# Patient Record
Sex: FEMALE | Race: WHITE | NOT HISPANIC OR LATINO | Employment: OTHER | URBAN - METROPOLITAN AREA
[De-identification: names, ages, dates, MRNs, and addresses within clinical notes are randomized per-mention and may not be internally consistent; named-entity substitution may affect disease eponyms.]

---

## 2018-03-06 ENCOUNTER — OFFICE VISIT (OUTPATIENT)
Dept: URGENT CARE | Facility: CLINIC | Age: 43
End: 2018-03-06
Payer: COMMERCIAL

## 2018-03-06 VITALS
SYSTOLIC BLOOD PRESSURE: 112 MMHG | RESPIRATION RATE: 18 BRPM | HEART RATE: 106 BPM | OXYGEN SATURATION: 94 % | DIASTOLIC BLOOD PRESSURE: 68 MMHG | TEMPERATURE: 98.5 F

## 2018-03-06 DIAGNOSIS — J06.9 URI WITH COUGH AND CONGESTION: ICD-10-CM

## 2018-03-06 DIAGNOSIS — R52 BODY ACHES: ICD-10-CM

## 2018-03-06 DIAGNOSIS — J02.9 PHARYNGITIS, UNSPECIFIED ETIOLOGY: Primary | ICD-10-CM

## 2018-03-06 LAB
FLUAV+FLUBV AG SPEC QL IA: NORMAL
INT CON NEG: NEGATIVE
INT CON NEG: NEGATIVE
INT CON POS: POSITIVE
INT CON POS: POSITIVE
S PYO AG THROAT QL: NORMAL

## 2018-03-06 PROCEDURE — 87880 STREP A ASSAY W/OPTIC: CPT | Performed by: PHYSICIAN ASSISTANT

## 2018-03-06 PROCEDURE — 87804 INFLUENZA ASSAY W/OPTIC: CPT | Performed by: PHYSICIAN ASSISTANT

## 2018-03-06 PROCEDURE — 99204 OFFICE O/P NEW MOD 45 MIN: CPT | Performed by: PHYSICIAN ASSISTANT

## 2018-03-06 RX ORDER — CEFDINIR 300 MG/1
300 CAPSULE ORAL 2 TIMES DAILY
Qty: 20 CAP | Refills: 0 | Status: SHIPPED | OUTPATIENT
Start: 2018-03-06 | End: 2018-03-16

## 2018-03-06 RX ORDER — BENZONATATE 200 MG/1
200 CAPSULE ORAL 3 TIMES DAILY PRN
Qty: 30 CAP | Refills: 0 | Status: SHIPPED | OUTPATIENT
Start: 2018-03-06 | End: 2018-03-16

## 2018-03-06 RX ORDER — LOSARTAN POTASSIUM 25 MG/1
25 TABLET ORAL DAILY
COMMUNITY
Start: 2018-02-06

## 2018-03-06 RX ORDER — METHOCARBAMOL 750 MG/1
TABLET, FILM COATED ORAL
COMMUNITY
Start: 2018-02-20 | End: 2019-12-26

## 2018-03-06 RX ORDER — BUPRENORPHINE HYDROCHLORIDE, NALOXONE HYDROCHLORIDE 8; 2 MG/1; MG/1
FILM, SOLUBLE BUCCAL; SUBLINGUAL
COMMUNITY
Start: 2018-01-17 | End: 2019-11-21

## 2018-03-06 RX ORDER — METOPROLOL SUCCINATE 50 MG/1
50 TABLET, EXTENDED RELEASE ORAL DAILY
COMMUNITY
Start: 2018-02-06

## 2018-03-06 RX ORDER — HYDROCHLOROTHIAZIDE 25 MG/1
25 TABLET ORAL DAILY
COMMUNITY
Start: 2018-02-06

## 2018-03-06 RX ORDER — METHYLPREDNISOLONE 4 MG/1
TABLET ORAL
Qty: 21 TAB | Refills: 0 | Status: SHIPPED | OUTPATIENT
Start: 2018-03-06 | End: 2018-03-16

## 2018-03-06 RX ORDER — TOPIRAMATE 50 MG/1
50 TABLET, FILM COATED ORAL DAILY
COMMUNITY
Start: 2018-02-11

## 2018-03-06 NOTE — PATIENT INSTRUCTIONS
Pharyngitis  Pharyngitis is a sore throat (pharynx). There is redness, pain, and swelling of your throat.  Follow these instructions at home:  · Drink enough fluids to keep your pee (urine) clear or pale yellow.  · Only take medicine as told by your doctor.  ¨ You may get sick again if you do not take medicine as told. Finish your medicines, even if you start to feel better.  ¨ Do not take aspirin.  · Rest.  · Rinse your mouth (gargle) with salt water (½ tsp of salt per 1 qt of water) every 1-2 hours. This will help the pain.  · If you are not at risk for choking, you can suck on hard candy or sore throat lozenges.  Contact a doctor if:  · You have large, tender lumps on your neck.  · You have a rash.  · You cough up green, yellow-brown, or bloody spit.  Get help right away if:  · You have a stiff neck.  · You drool or cannot swallow liquids.  · You throw up (vomit) or are not able to keep medicine or liquids down.  · You have very bad pain that does not go away with medicine.  · You have problems breathing (not from a stuffy nose).  This information is not intended to replace advice given to you by your health care provider. Make sure you discuss any questions you have with your health care provider.  Document Released: 06/05/2009 Document Revised: 05/25/2017 Document Reviewed: 08/25/2014  [employer] Interactive Patient Education © 2017 [employer]

## 2018-03-07 NOTE — PROGRESS NOTES
Subjective:      PT is a 43 y.o. female who presents with Sore Throat (with a migrane last night and body is sore and weak)            HPI  PT presents to  clinic today complaining of sore throat, pressure in ears, cough, fatigue, runny nose and headaches. PT denies CP, SOB, NVD, abdominal pain, joint pain. PT states these symptoms began around 1 day ago. PT states the pain is a 7/10 with swallowing, aching in nature and worse at night.  Pt has not taken any RX medications for this condition. The pt's medication list, problem list, and allergies have been evaluated and reviewed during today's visit.      PMH:  Past Medical History:   Diagnosis Date   • Anesthesia     postop n/v   • Arthritis     lower back   • Bursitis of left hip    • Chronic fatigue    • Cold 11/15   • Fibromyalgia    • Hx MRSA infection 2007    left knee   • Hypertension     pt states well controlled on meds   • Neurogenic bladder    • Other specified disorder of intestines     constipation    • Other specified symptom associated with female genital organs    • Pain 10-10-14    low back,, left hip and leg, right foot, 6/10   • Pain 2/19/15    5/10 lower back   • Psychiatric problem     depression, anxiety, PTSD, panic disorder   • Pyelonephritis     h/o as a child   • Renal disorder     pyelonephritis as a child, hx of freq bladder infections   • Snoring    • Urinary bladder disorder     neurogenic bladder       PSH:  Past Surgical History:   Procedure Laterality Date   • GANGLION EXCISION Left 12/14/2016    Procedure: GANGLION EXCISION VOLAR WRIST;  Surgeon: Mayur Delgadillo M.D.;  Location: SURGERY [facility];  Service:    • CARPAL TUNNEL RELEASE Right 9/23/2015    Procedure: FIRST DORSAL EXTENSOR COMPARTMENT RELEASE;  Surgeon: Mayur Delgadillo M.D.;  Location: SURGERY SAME DAY [facility];  Service:    • EXTENSOR TENDON REPAIR Left 8/5/2015    Procedure: EXTENSOR TENDON REPAIR FOR: FIRST DORSAL EXTENSOR COMPARTMENT RELEASE ;   "Surgeon: Mayur Delgadillo M.D.;  Location: SURGERY SAME DAY [facility];  Service:    • LUMBAR FUSION POSTERIOR  2015    Performed by Thomas Harris M.D. at SURGERY [facility]S   • LUMBAR EXPLORATION  2015    Performed by Thomas Harris M.D. at SURGERY [facility]S   • LUMBAR LAMINECTOMY DISKECTOMY  10/23/2014    Performed by Thomas Harris M.D. at SURGERY [facility]S   • OTHER      \"nerves burned in back\"   • PRIMARY C SECTION  &           Fam Hx:  the patient's family history is not pertinent to their current complaint    Soc HX:  Social History     Social History   • Marital status:      Spouse name: N/A   • Number of children: N/A   • Years of education: N/A     Occupational History   • Not on file.     Social History Main Topics   • Smoking status: Never Smoker   • Smokeless tobacco: Never Used   • Alcohol use No   • Drug use: No   • Sexual activity: Not on file     Other Topics Concern   • Not on file     Social History Narrative   • No narrative on file         Medications:    Current Outpatient Prescriptions:   •  SUBOXONE 8-2 MG FILM, , Disp: , Rfl:   •  hydroCHLOROthiazide (HYDRODIURIL) 25 MG Tab, , Disp: , Rfl:   •  losartan (COZAAR) 25 MG Tab, , Disp: , Rfl:   •  methocarbamol (ROBAXIN) 750 MG Tab, , Disp: , Rfl:   •  metoprolol SR (TOPROL XL) 50 MG TABLET SR 24 HR, , Disp: , Rfl:   •  topiramate (TOPAMAX) 50 MG tablet, , Disp: , Rfl:   •  cefdinir (OMNICEF) 300 MG Cap, Take 1 Cap by mouth 2 times a day for 10 days., Disp: 20 Cap, Rfl: 0  •  MethylPREDNISolone (MEDROL DOSEPAK) 4 MG Tablet Therapy Pack, Use as directed, Disp: 21 Tab, Rfl: 0  •  benzonatate (TESSALON) 200 MG capsule, Take 1 Cap by mouth 3 times a day as needed for up to 10 days., Disp: 30 Cap, Rfl: 0  •  hydrocodone-acetaminophen (NORCO) 5-325 MG Tab per tablet, Take 1-2 Tabs by mouth every four hours as needed., Disp: 20 Tab, Rfl: 0  •  ibuprofen (MOTRIN) 200 MG Tab, Take 1,000 " mg by mouth every 8 hours as needed., Disp: , Rfl:   •  topiramate (TOPOMAX) 15 MG CAPSULE SPRINKLE, Take 15 mg by mouth every day., Disp: , Rfl:   •  losartan (COZAAR) 50 MG Tab, Take 50 mg by mouth every day., Disp: , Rfl:   •  alprazolam (XANAX) 0.5 MG Tab, Take 0.5 mg by mouth 3 times a day as needed for Anxiety., Disp: , Rfl:   •  buprenorphine-naloxone (SUBOXONE) 8-2 MG SUBL, Place 1 Tab under tongue 3 times a day. Film, Disp: , Rfl:   •  multivitamin (THERAGRAN) TABS, Take 1 Tab by mouth every morning., Disp: , Rfl:   •  metoprolol (LOPRESSOR) 25 MG TABS, Take 25 mg by mouth 2 times a day. Indications: High Blood Pressure, Disp: , Rfl:   •  duloxetine (CYMBALTA) 60 MG CPEP delayed-release capsule, Take 60 mg by mouth every morning., Disp: , Rfl:       Allergies:  Septra [bactrim] and Other food    ROS  Constitutional: Positive for malaise/fatigue.   HENT: Positive for congestion and sore throat. Negative for ear pain.    Eyes: Negative for blurred vision, double vision and photophobia.   Respiratory: Positive for cough and sputum production. Negative for hemoptysis, shortness of breath and wheezing.    Cardiovascular: Negative for chest pain and palpitations.   Gastrointestinal: Negative for nausea, vomiting, abdominal pain, diarrhea and constipation.   Genitourinary: Negative for dysuria and flank pain.   Musculoskeletal: Negative for falls and myalgias.   Skin: Negative for itching and rash.   Neurological: Positive for headaches. Negative for dizziness and tingling.   Endo/Heme/Allergies: Does not bruise/bleed easily.   Psychiatric/Behavioral: Negative for depression. The patient is not nervous/anxious.             Objective:     /68   Pulse (!) 106   Temp 36.9 °C (98.5 °F)   Resp 18   SpO2 94%      Physical Exam      Constitutional: PT is oriented to person, place, and time. PT appears well-developed and well-nourished. No distress.   HENT:   Head: Normocephalic and atraumatic.   Right Ear:  Hearing, tympanic membrane, external ear and ear canal normal.   Left Ear: Hearing, tympanic membrane, external ear and ear canal normal.   Nose: Mucosal edema, rhinorrhea and sinus tenderness present. Right sinus exhibits frontal sinus tenderness. Left sinus exhibits frontal sinus tenderness.   Mouth/Throat: Uvula is midline. Mucous membranes are pale. Posterior oropharyngeal edema and posterior oropharyngeal erythema present. No oropharyngeal exudate.   Eyes: Conjunctivae normal and EOM are normal. Pupils are equal, round, and reactive to light.   Neck: Normal range of motion. Neck supple. No thyromegaly present.   Cardiovascular: Normal rate, regular rhythm, normal heart sounds and intact distal pulses.  Exam reveals no gallop and no friction rub.    No murmur heard.  Pulmonary/Chest: Effort normal and breath sounds normal. No respiratory distress. PT has no wheezes. PT has no rales. PT exhibits no tenderness.   Abdominal: Soft. Bowel sounds are normal. PT exhibits no distension and no mass. There is no tenderness. There is no rebound and no guarding.   Musculoskeletal: Normal range of motion. PT exhibits no edema and no tenderness.   Lymphadenopathy:     PT has no cervical adenopathy.   Neurological: PT is alert and oriented to person, place, and time. PT displays normal reflexes. No cranial nerve deficit. PT exhibits normal muscle tone. Coordination normal.   Skin: Skin is warm and dry. No rash noted. No erythema.   Psychiatric: PT has a normal mood and affect. PT behavior is normal. Judgment and thought content normal.          Assessment/Plan:     1. Pharyngitis, unspecified etiology  Back-up abx if symptoms do not improve in 2-3 days. PT on clinical presentation has exudate on oropharynx and it's possible beyond the strep A testing that this could be a different type of strep (C/G) or A. haemolyticum     - POCT Rapid Strep A-->NEG  - cefdinir (OMNICEF) 300 MG Cap; Take 1 Cap by mouth 2 times a day for 10  days.  Dispense: 20 Cap; Refill: 0  - MethylPREDNISolone (MEDROL DOSEPAK) 4 MG Tablet Therapy Pack; Use as directed  Dispense: 21 Tab; Refill: 0    2. URI with cough and congestion    - POCT Influenza A/B-->NEG  - MethylPREDNISolone (MEDROL DOSEPAK) 4 MG Tablet Therapy Pack; Use as directed  Dispense: 21 Tab; Refill: 0  - benzonatate (TESSALON) 200 MG capsule; Take 1 Cap by mouth 3 times a day as needed for up to 10 days.  Dispense: 30 Cap; Refill: 0    3. Body aches      Rest, fluids encouraged.  OTC decongestant for congestion  Note given for work.  AVS with medical info given.  Pt was in full understanding and agreement with the plan.  Follow-up as needed if symptoms worsen or fail to improve.

## 2018-03-16 ENCOUNTER — APPOINTMENT (OUTPATIENT)
Dept: RADIOLOGY | Facility: IMAGING CENTER | Age: 43
End: 2018-03-16
Attending: PHYSICIAN ASSISTANT
Payer: COMMERCIAL

## 2018-03-16 ENCOUNTER — OFFICE VISIT (OUTPATIENT)
Dept: URGENT CARE | Facility: CLINIC | Age: 43
End: 2018-03-16
Payer: COMMERCIAL

## 2018-03-16 VITALS
TEMPERATURE: 97.7 F | RESPIRATION RATE: 20 BRPM | SYSTOLIC BLOOD PRESSURE: 124 MMHG | HEIGHT: 63 IN | HEART RATE: 100 BPM | OXYGEN SATURATION: 91 % | DIASTOLIC BLOOD PRESSURE: 68 MMHG | WEIGHT: 220 LBS | BODY MASS INDEX: 38.98 KG/M2

## 2018-03-16 DIAGNOSIS — J06.9 URI WITH COUGH AND CONGESTION: ICD-10-CM

## 2018-03-16 DIAGNOSIS — J40 BRONCHITIS: Primary | ICD-10-CM

## 2018-03-16 PROCEDURE — 71046 X-RAY EXAM CHEST 2 VIEWS: CPT | Mod: 26 | Performed by: PHYSICIAN ASSISTANT

## 2018-03-16 PROCEDURE — 99214 OFFICE O/P EST MOD 30 MIN: CPT | Performed by: PHYSICIAN ASSISTANT

## 2018-03-16 RX ORDER — PROMETHAZINE HYDROCHLORIDE AND CODEINE PHOSPHATE 6.25; 1 MG/5ML; MG/5ML
5 SYRUP ORAL 4 TIMES DAILY PRN
Qty: 240 ML | Refills: 0 | Status: SHIPPED | OUTPATIENT
Start: 2018-03-16 | End: 2018-03-30

## 2018-03-16 RX ORDER — DOXYCYCLINE HYCLATE 100 MG
100 TABLET ORAL 2 TIMES DAILY
Qty: 14 TAB | Refills: 0 | Status: SHIPPED | OUTPATIENT
Start: 2018-03-16 | End: 2018-03-23

## 2018-03-16 RX ORDER — IPRATROPIUM BROMIDE AND ALBUTEROL SULFATE 2.5; .5 MG/3ML; MG/3ML
3 SOLUTION RESPIRATORY (INHALATION) ONCE
Status: COMPLETED | OUTPATIENT
Start: 2018-03-16 | End: 2018-03-16

## 2018-03-16 RX ORDER — METHYLPREDNISOLONE 4 MG/1
TABLET ORAL
Qty: 21 TAB | Refills: 0 | Status: SHIPPED | OUTPATIENT
Start: 2018-03-16 | End: 2019-11-21

## 2018-03-16 RX ADMIN — IPRATROPIUM BROMIDE AND ALBUTEROL SULFATE 3 ML: 2.5; .5 SOLUTION RESPIRATORY (INHALATION) at 13:37

## 2018-03-16 NOTE — PROGRESS NOTES
Subjective:      Pt is a 43 y.o. female who presents with URI            HPI  PT presents to [facility] clinic today complaining of sore throat,  pressure in ears, cough, fatigue, runny nose, wheezing and SOB. PT denies CP, NVD, abdominal pain, joint pain. PT states these symptoms began around 7 days ago. PT states the pain is a 7/10 with coughing, aching in nature and worse at night.  Pt has not taken any RX medications for this condition. The pt's medication list, problem list, and allergies have been evaluated and reviewed during today's visit.    PMH:  Past Medical History:   Diagnosis Date   • Anesthesia     postop n/v   • Arthritis     lower back   • Bursitis of left hip    • Chronic fatigue    • Cold 11/15   • Fibromyalgia    • Hx MRSA infection 2007    left knee   • Hypertension     pt states well controlled on meds   • Neurogenic bladder    • Other specified disorder of intestines     constipation    • Other specified symptom associated with female genital organs    • Pain 10-10-14    low back,, left hip and leg, right foot, 6/10   • Pain 2/19/15    5/10 lower back   • Psychiatric problem     depression, anxiety, PTSD, panic disorder   • Pyelonephritis     h/o as a child   • Renal disorder     pyelonephritis as a child, hx of freq bladder infections   • Snoring    • Urinary bladder disorder     neurogenic bladder       PSH:  Past Surgical History:   Procedure Laterality Date   • GANGLION EXCISION Left 12/14/2016    Procedure: GANGLION EXCISION VOLAR WRIST;  Surgeon: Mayur Delgadillo M.D.;  Location: SURGERY [facility];  Service:    • CARPAL TUNNEL RELEASE Right 9/23/2015    Procedure: FIRST DORSAL EXTENSOR COMPARTMENT RELEASE;  Surgeon: Mayur Delgadillo M.D.;  Location: SURGERY SAME DAY [facility];  Service:    • EXTENSOR TENDON REPAIR Left 8/5/2015    Procedure: EXTENSOR TENDON REPAIR FOR: FIRST DORSAL EXTENSOR COMPARTMENT RELEASE ;  Surgeon: Mayur Delgadillo M.D.;  Location: SURGERY SAME DAY  "[facility];  Service:    • LUMBAR FUSION POSTERIOR  2015    Performed by Thomas Harris M.D. at SURGERY [facility]S   • LUMBAR EXPLORATION  2015    Performed by Thomas Harris M.D. at SURGERY [facility]   • LUMBAR LAMINECTOMY DISKECTOMY  10/23/2014    Performed by Thomas Harris M.D. at SURGERY [facility]   • OTHER      \"nerves burned in back\"   • PRIMARY C SECTION  &           Fam Hx:  the patient's family history is not pertinent to their current complaint    Soc HX:  Social History     Social History   • Marital status:      Spouse name: N/A   • Number of children: N/A   • Years of education: N/A     Occupational History   • Not on file.     Social History Main Topics   • Smoking status: Never Smoker   • Smokeless tobacco: Never Used   • Alcohol use No   • Drug use: No   • Sexual activity: Not on file     Other Topics Concern   • Not on file     Social History Narrative   • No narrative on file         Medications:    Current Outpatient Prescriptions:   •  doxycycline (VIBRAMYCIN) 100 MG Tab, Take 1 Tab by mouth 2 times a day for 7 days., Disp: 14 Tab, Rfl: 0  •  promethazine-codeine (PHENERGAN-CODEINE) 6.25-10 MG/5ML Syrup, Take 5 mL by mouth 4 times a day as needed for up to 14 days., Disp: 240 mL, Rfl: 0  •  MethylPREDNISolone (MEDROL DOSEPAK) 4 MG Tablet Therapy Pack, Use as directed, Disp: 21 Tab, Rfl: 0  •  SUBOXONE 8-2 MG FILM, , Disp: , Rfl:   •  hydroCHLOROthiazide (HYDRODIURIL) 25 MG Tab, , Disp: , Rfl:   •  losartan (COZAAR) 25 MG Tab, , Disp: , Rfl:   •  methocarbamol (ROBAXIN) 750 MG Tab, , Disp: , Rfl:   •  metoprolol SR (TOPROL XL) 50 MG TABLET SR 24 HR, , Disp: , Rfl:   •  topiramate (TOPAMAX) 50 MG tablet, , Disp: , Rfl:   •  cefdinir (OMNICEF) 300 MG Cap, Take 1 Cap by mouth 2 times a day for 10 days., Disp: 20 Cap, Rfl: 0  •  benzonatate (TESSALON) 200 MG capsule, Take 1 Cap by mouth 3 times a day as needed for up to 10 days., Disp: 30 " Cap, Rfl: 0  •  ibuprofen (MOTRIN) 200 MG Tab, Take 1,000 mg by mouth every 8 hours as needed., Disp: , Rfl:   •  hydrocodone-acetaminophen (NORCO) 5-325 MG Tab per tablet, Take 1-2 Tabs by mouth every four hours as needed., Disp: 20 Tab, Rfl: 0  •  topiramate (TOPOMAX) 15 MG CAPSULE SPRINKLE, Take 15 mg by mouth every day., Disp: , Rfl:   •  losartan (COZAAR) 50 MG Tab, Take 50 mg by mouth every day., Disp: , Rfl:   •  alprazolam (XANAX) 0.5 MG Tab, Take 0.5 mg by mouth 3 times a day as needed for Anxiety., Disp: , Rfl:   •  buprenorphine-naloxone (SUBOXONE) 8-2 MG SUBL, Place 1 Tab under tongue 3 times a day. Film, Disp: , Rfl:   •  multivitamin (THERAGRAN) TABS, Take 1 Tab by mouth every morning., Disp: , Rfl:   •  metoprolol (LOPRESSOR) 25 MG TABS, Take 25 mg by mouth 2 times a day. Indications: High Blood Pressure, Disp: , Rfl:   •  duloxetine (CYMBALTA) 60 MG CPEP delayed-release capsule, Take 60 mg by mouth every morning., Disp: , Rfl:       Allergies:  Septra [bactrim] and Other food    ROS  Review of Systems   Constitutional: Positive for malaise/fatigue. Negative for fever and diaphoresis.   HENT: Positive for congestion, ear pain and sore throat. Negative for ear discharge, hearing loss, nosebleeds and tinnitus.    Eyes: Negative for blurred vision, double vision and photophobia.   Respiratory: Positive for cough, sputum production, shortness of breath and wheezing. Negative for hemoptysis.    Cardiovascular: Negative for chest pain and palpitations.   Gastrointestinal: Negative for nausea, vomiting, abdominal pain, diarrhea and constipation.   Genitourinary: Negative for dysuria and flank pain.   Musculoskeletal: Negative for joint pain and myalgias.   Skin: Negative for itching and rash.   Neurological: Positive for headaches. Negative for dizziness, tingling and weakness.   Endo/Heme/Allergies: Does not bruise/bleed easily.   Psychiatric/Behavioral: Negative for depression. The patient is not  "nervous/anxious.               Objective:     /68   Pulse 100   Temp 36.5 °C (97.7 °F)   Resp 20   Ht 1.6 m (5' 3\")   Wt 99.8 kg (220 lb)   SpO2 91%   BMI 38.97 kg/m²      Physical Exam      Physical Exam   Constitutional: PT is oriented to person, place, and time. PT appears well-developed and well-nourished. No distress.   HENT:   Head: Normocephalic and atraumatic.   Right Ear: Hearing, tympanic membrane, external ear and ear canal normal.   Left Ear: Hearing, tympanic membrane, external ear and ear canal normal.   Nose: Mucosal edema, rhinorrhea and sinus tenderness present. Right sinus exhibits frontal sinus tenderness. Left sinus exhibits frontal sinus tenderness.   Mouth/Throat: Uvula is midline. Mucous membranes are pale. Posterior oropharyngeal edema and posterior oropharyngeal erythema present. No oropharyngeal exudate.   Eyes: Conjunctivae normal and EOM are normal. Pupils are equal, round, and reactive to light. Right eye exhibits no discharge. Left eye exhibits no discharge.   Neck: Normal range of motion. Neck supple. No thyromegaly present.   Cardiovascular: Normal rate, regular rhythm, normal heart sounds and intact distal pulses.  Exam reveals no gallop and no friction rub.    No murmur heard.  Pulmonary/Chest: Effort normal. No respiratory distress. PT has wheezes. PT has no rales. PT exhibits tenderness.   Abdominal: Soft. Bowel sounds are normal. PT exhibits no distension and no mass. There is no tenderness. There is no rebound and no guarding.   Musculoskeletal: Normal range of motion. PT exhibits no edema and no tenderness.   Lymphadenopathy:     PT has no cervical adenopathy.   Neurological: Pt is alert and oriented to person, place, and time. Pt has normal reflexes. No cranial nerve deficit.   Skin: Skin is warm and dry. No rash noted. No erythema.   Psychiatric: PT has a normal mood and affect. Pt behavior is normal. Judgment and thought content normal.     RADS:  Narrative "       3/16/2018 12:48 PM    HISTORY/REASON FOR EXAM:  Cough and congestion for one week.      TECHNIQUE/EXAM DESCRIPTION AND NUMBER OF VIEWS:  Two views of the chest.    COMPARISON:  10/10/2014    FINDINGS:  The heart is normal in size.  No pulmonary infiltrates or consolidations are noted.  No pleural effusions are appreciated.     Impression       Negative two views of the chest.   Reading Provider Reading Date   Silvestre King M.D. Mar 16, 2018   Signing Provider Signing Date Signing Time   Silvestre King M.D. Mar 16, 2018  1:00 PM          Assessment/Plan:     1. Bronchitis    - doxycycline (VIBRAMYCIN) 100 MG Tab; Take 1 Tab by mouth 2 times a day for 7 days.  Dispense: 14 Tab; Refill: 0  - MethylPREDNISolone (MEDROL DOSEPAK) 4 MG Tablet Therapy Pack; Use as directed  Dispense: 21 Tab; Refill: 0    2. URI with cough and congestion    - promethazine-codeine (PHENERGAN-CODEINE) 6.25-10 MG/5ML Syrup; Take 5 mL by mouth 4 times a day as needed for up to 14 days.  Dispense: 240 mL; Refill: 0  - MethylPREDNISolone (MEDROL DOSEPAK) 4 MG Tablet Therapy Pack; Use as directed  Dispense: 21 Tab; Refill: 0    Rest, fluids encouraged.  OTC decongestant for congestion/cough  AVS with medical info given.  Pt was in full understanding and agreement with the plan.  Follow-up as needed if symptoms worsen or fail to improve.

## 2018-03-16 NOTE — PATIENT INSTRUCTIONS
Acute Bronchitis, Adult  Acute bronchitis is when air tubes (bronchi) in the lungs suddenly get swollen. The condition can make it hard to breathe. It can also cause these symptoms:  · A cough.  · Coughing up clear, yellow, or green mucus.  · Wheezing.  · Chest congestion.  · Shortness of breath.  · A fever.  · Body aches.  · Chills.  · A sore throat.  Follow these instructions at home:  Medicines  · Take over-the-counter and prescription medicines only as told by your doctor.  · If you were prescribed an antibiotic medicine, take it as told by your doctor. Do not stop taking the antibiotic even if you start to feel better.  General instructions  · Rest.  · Drink enough fluids to keep your pee (urine) clear or pale yellow.  · Avoid smoking and secondhand smoke. If you smoke and you need help quitting, ask your doctor. Quitting will help your lungs heal faster.  · Use an inhaler, cool mist vaporizer, or humidifier as told by your doctor.  · Keep all follow-up visits as told by your doctor. This is important.  How is this prevented?  To lower your risk of getting this condition again:  · Wash your hands often with soap and water. If you cannot use soap and water, use hand .  · Avoid contact with people who have cold symptoms.  · Try not to touch your hands to your mouth, nose, or eyes.  · Make sure to get the flu shot every year.  Contact a doctor if:  · Your symptoms do not get better in 2 weeks.  Get help right away if:  · You cough up blood.  · You have chest pain.  · You have very bad shortness of breath.  · You become dehydrated.  · You faint (pass out) or keep feeling like you are going to pass out.  · You keep throwing up (vomiting).  · You have a very bad headache.  · Your fever or chills gets worse.  This information is not intended to replace advice given to you by your health care provider. Make sure you discuss any questions you have with your health care provider.  Document Released: 06/05/2009  Document Revised: 07/26/2017 Document Reviewed: 06/07/2017  [employer] Interactive Patient Education © 2017 [employer]

## 2019-08-01 ENCOUNTER — OFFICE VISIT (OUTPATIENT)
Dept: URGENT CARE | Facility: MEDICAL CENTER | Age: 44
End: 2019-08-01
Payer: COMMERCIAL

## 2019-08-01 VITALS
HEIGHT: 63 IN | TEMPERATURE: 97.8 F | DIASTOLIC BLOOD PRESSURE: 86 MMHG | RESPIRATION RATE: 14 BRPM | BODY MASS INDEX: 35.44 KG/M2 | WEIGHT: 200 LBS | OXYGEN SATURATION: 96 % | SYSTOLIC BLOOD PRESSURE: 132 MMHG | HEART RATE: 82 BPM

## 2019-08-01 DIAGNOSIS — G43.109 MIGRAINE WITH AURA AND WITHOUT STATUS MIGRAINOSUS, NOT INTRACTABLE: ICD-10-CM

## 2019-08-01 PROCEDURE — 99214 OFFICE O/P EST MOD 30 MIN: CPT | Performed by: PHYSICIAN ASSISTANT

## 2019-08-01 RX ORDER — ONDANSETRON 2 MG/ML
4 INJECTION INTRAMUSCULAR; INTRAVENOUS ONCE
Status: COMPLETED | OUTPATIENT
Start: 2019-08-01 | End: 2019-08-01

## 2019-08-01 RX ORDER — KETOROLAC TROMETHAMINE 30 MG/ML
60 INJECTION, SOLUTION INTRAMUSCULAR; INTRAVENOUS ONCE
Status: COMPLETED | OUTPATIENT
Start: 2019-08-01 | End: 2019-08-01

## 2019-08-01 RX ADMIN — ONDANSETRON 4 MG: 2 INJECTION INTRAMUSCULAR; INTRAVENOUS at 13:20

## 2019-08-01 RX ADMIN — KETOROLAC TROMETHAMINE 60 MG: 30 INJECTION, SOLUTION INTRAMUSCULAR; INTRAVENOUS at 13:22

## 2019-08-01 ASSESSMENT — ENCOUNTER SYMPTOMS
HEADACHES: 1
BLOOD IN STOOL: 1
ABDOMINAL PAIN: 0
FEVER: 0
NAUSEA: 1
VOMITING: 1
CHILLS: 0
SENSORY CHANGE: 0
CONSTIPATION: 0

## 2019-08-01 NOTE — PROGRESS NOTES
Subjective:   Santa Carter is a 44 y.o. female who presents today with   Chief Complaint   Patient presents with   • Nausea     headache, made herself throw up, migraine, x today blood in stool       HPI  Patient presents for a new problem that is started since today.  She states she has been taking Topamax with relief of her migraines that she typically has.  This migraine feels similar to those that she has had in the past but the Topamax is not relieving her symptoms.  She states she has also been nauseous and noticed a very small amount of blood in her stool this morning.  She states that she made herself throw up.  In the past she has had Toradol shots that has helped her symptoms.  She is scheduled for an SI injection and is why she is traveling to [place] today.  Patient denies any fever, chills, vision changes or any other associated symptoms despite those listed above.  PMH:  has a past medical history of Anesthesia, Arthritis, Bursitis of left hip, Chronic fatigue, Cold (11/15), Fibromyalgia, MRSA infection (2007), Hypertension, Neurogenic bladder, Other specified disorder of intestines, Other specified symptom associated with female genital organs, Pain (10-10-14), Pain (2/19/15), Psychiatric problem, Pyelonephritis, Renal disorder, Snoring, and Urinary bladder disorder.  MEDS:   Current Outpatient Medications:   •  hydroCHLOROthiazide (HYDRODIURIL) 25 MG Tab, , Disp: , Rfl:   •  losartan (COZAAR) 25 MG Tab, , Disp: , Rfl:   •  methocarbamol (ROBAXIN) 750 MG Tab, , Disp: , Rfl:   •  topiramate (TOPAMAX) 50 MG tablet, , Disp: , Rfl:   •  losartan (COZAAR) 50 MG Tab, Take 50 mg by mouth every day., Disp: , Rfl:   •  alprazolam (XANAX) 0.5 MG Tab, Take 0.5 mg by mouth 3 times a day as needed for Anxiety., Disp: , Rfl:   •  buprenorphine-naloxone (SUBOXONE) 8-2 MG SUBL, Place 1 Tab under tongue 3 times a day. Film, Disp: , Rfl:   •  metoprolol (LOPRESSOR) 25 MG TABS, Take 25 mg by mouth 2 times a  "day. Indications: High Blood Pressure, Disp: , Rfl:   •  duloxetine (CYMBALTA) 60 MG CPEP delayed-release capsule, Take 60 mg by mouth every morning., Disp: , Rfl:   •  MethylPREDNISolone (MEDROL DOSEPAK) 4 MG Tablet Therapy Pack, Use as directed, Disp: 21 Tab, Rfl: 0  •  SUBOXONE 8-2 MG FILM, , Disp: , Rfl:   •  metoprolol SR (TOPROL XL) 50 MG TABLET SR 24 HR, , Disp: , Rfl:   •  hydrocodone-acetaminophen (NORCO) 5-325 MG Tab per tablet, Take 1-2 Tabs by mouth every four hours as needed. (Patient not taking: Reported on 8/1/2019), Disp: 20 Tab, Rfl: 0  •  topiramate (TOPOMAX) 15 MG CAPSULE SPRINKLE, Take 15 mg by mouth every day., Disp: , Rfl:   •  multivitamin (THERAGRAN) TABS, Take 1 Tab by mouth every morning., Disp: , Rfl:   ALLERGIES:   Allergies   Allergen Reactions   • Septra [Bactrim] Anaphylaxis and Hives     ORT=0830   • Other Food Nausea     Coconut fresh only ok with coconut oil in food     SURGHX:   Past Surgical History:   Procedure Laterality Date   • GANGLION EXCISION Left 12/14/2016    Procedure: GANGLION EXCISION VOLAR WRIST;  Surgeon: Mayur Delgadillo M.D.;  Location: SURGERY [facility];  Service:    • CARPAL TUNNEL RELEASE Right 9/23/2015    Procedure: FIRST DORSAL EXTENSOR COMPARTMENT RELEASE;  Surgeon: Mayur Delgadillo M.D.;  Location: SURGERY SAME DAY [facility];  Service:    • EXTENSOR TENDON REPAIR Left 8/5/2015    Procedure: EXTENSOR TENDON REPAIR FOR: FIRST DORSAL EXTENSOR COMPARTMENT RELEASE ;  Surgeon: Mayur Delgadillo M.D.;  Location: SURGERY SAME DAY [facility];  Service:    • LUMBAR FUSION POSTERIOR  2/25/2015    Performed by Thomas Harris M.D. at SURGERY [facility]   • LUMBAR EXPLORATION  2/25/2015    Performed by Thomas Harris M.D. at SURGERY [facility]   • LUMBAR LAMINECTOMY DISKECTOMY  10/23/2014    Performed by Thomas Harris M.D. at [facility]   • OTHER  2010    \"nerves burned in back\"   • PRIMARY C SECTION  1996&2005    " "     SOCHX:  reports that she has never smoked. She has never used smokeless tobacco. She reports that she does not drink alcohol or use drugs.  FH: Reviewed with patient, not pertinent to this visit.       Review of Systems   Constitutional: Negative for chills and fever.   HENT: Negative for hearing loss.    Gastrointestinal: Positive for blood in stool, nausea and vomiting. Negative for abdominal pain and constipation.   Genitourinary: Negative for dysuria, frequency, hematuria and urgency.   Neurological: Positive for headaches. Negative for sensory change.   All other systems reviewed and are negative.       Objective:   /86   Pulse 82   Temp 36.6 °C (97.8 °F)   Resp 14   Ht 1.6 m (5' 3\")   Wt 90.7 kg (200 lb)   SpO2 96%   BMI 35.43 kg/m²   Physical Exam   Constitutional: She appears well-developed and well-nourished. No distress.   HENT:   Head: Normocephalic and atraumatic.   Right Ear: Hearing normal.   Left Ear: Hearing normal.   Eyes: Pupils are equal, round, and reactive to light.   Cardiovascular: Normal rate, regular rhythm and normal heart sounds.   Pulmonary/Chest: Effort normal and breath sounds normal.   Abdominal: Soft. Bowel sounds are normal. She exhibits no distension. There is no tenderness.   Musculoskeletal:   Normal movement in all 4 extremities   Neurological: She is alert. No cranial nerve deficit or sensory deficit. Coordination normal. GCS eye subscore is 4. GCS verbal subscore is 5. GCS motor subscore is 6.   Skin: Skin is warm and dry.   Psychiatric: She has a normal mood and affect.   Nursing note and vitals reviewed.    Patient tolerated injection in clinic today and reports mild relief of her symptoms.    Assessment/Plan:   Assessment    1. Migraine with aura and without status migrainosus, not intractable  - ketorolac (TORADOL) injection 60 mg  - ondansetron (ZOFRAN) syringe/vial injection 4 mg  Patient will notify her physician of medication that she received " today including Toradol and Zofran and possibly reschedule her injection.  Discussed potential side effects including but not limited to increased risk of GI bleed and patient is understanding and would like to have the injections done today in clinic.  Differential diagnosis, natural history, supportive care, and indications for immediate follow-up discussed.   Patient given instructions and understanding of medications and treatment.    If not improving in 3-5 days, F/U with PCP or return to UC if symptoms worsen.    Patient agreeable to plan.      Please note that this dictation was created using voice recognition software. I have made every reasonable attempt to correct obvious errors, but I expect that there are errors of grammar and possibly content that I did not discover before finalizing the note.    Reji Ramirez PA-C

## 2019-11-21 ENCOUNTER — OFFICE VISIT (OUTPATIENT)
Dept: URGENT CARE | Facility: CLINIC | Age: 44
End: 2019-11-21
Payer: COMMERCIAL

## 2019-11-21 ENCOUNTER — HOSPITAL ENCOUNTER (OUTPATIENT)
Dept: RADIOLOGY | Facility: MEDICAL CENTER | Age: 44
End: 2019-11-21
Attending: NURSE PRACTITIONER
Payer: COMMERCIAL

## 2019-11-21 VITALS
OXYGEN SATURATION: 98 % | RESPIRATION RATE: 12 BRPM | TEMPERATURE: 97.8 F | SYSTOLIC BLOOD PRESSURE: 106 MMHG | WEIGHT: 198.6 LBS | HEART RATE: 80 BPM | DIASTOLIC BLOOD PRESSURE: 78 MMHG | HEIGHT: 63 IN | BODY MASS INDEX: 35.19 KG/M2

## 2019-11-21 DIAGNOSIS — R35.0 URINARY FREQUENCY: ICD-10-CM

## 2019-11-21 DIAGNOSIS — R10.9 LEFT LATERAL ABDOMINAL PAIN: ICD-10-CM

## 2019-11-21 DIAGNOSIS — Z87.440 HISTORY OF RECURRENT UTIS: ICD-10-CM

## 2019-11-21 LAB
APPEARANCE UR: NORMAL
BILIRUB UR STRIP-MCNC: NEGATIVE MG/DL
COLOR UR AUTO: YELLOW
GLUCOSE UR STRIP.AUTO-MCNC: NEGATIVE MG/DL
KETONES UR STRIP.AUTO-MCNC: NEGATIVE MG/DL
LEUKOCYTE ESTERASE UR QL STRIP.AUTO: NEGATIVE
NITRITE UR QL STRIP.AUTO: NEGATIVE
PH UR STRIP.AUTO: 5.5 [PH] (ref 5–8)
PROT UR QL STRIP: NEGATIVE MG/DL
RBC UR QL AUTO: NEGATIVE
SP GR UR STRIP.AUTO: 1.02
UROBILINOGEN UR STRIP-MCNC: 0.2 MG/DL

## 2019-11-21 PROCEDURE — 99213 OFFICE O/P EST LOW 20 MIN: CPT | Performed by: NURSE PRACTITIONER

## 2019-11-21 PROCEDURE — 76775 US EXAM ABDO BACK WALL LIM: CPT

## 2019-11-21 PROCEDURE — 81002 URINALYSIS NONAUTO W/O SCOPE: CPT | Performed by: NURSE PRACTITIONER

## 2019-11-21 RX ORDER — CIPROFLOXACIN 500 MG/1
500 TABLET, FILM COATED ORAL
Refills: 0 | COMMUNITY
Start: 2019-11-13 | End: 2019-12-26

## 2019-11-21 NOTE — PROGRESS NOTES
Chief Complaint   Patient presents with   • UTI     last wednesday had uti and finished the antibiotics, just wants to make sure its gone, still feels pain on the lower abdominal, still feels the urge to urine x 7 days        HISTORY OF PRESENT ILLNESS: Patient is a 44 y.o. female who presents to urgent care today with concerns of a recent UTI.  Patient notes UTI symptoms started approximately 2 months ago.  She treated herself at home with 2 courses of unknown antibiotics with some improvement.  Her symptoms then worsened and she was treated in [place] with Cipro for 1 week.  She completed the Cipro course 2 days ago.  Her urgency has resolved but she is still having frequency as well as some left-sided abdominal discomfort.  She denies history of kidney stones but does note history of recurrent UTIs and pyelonephritis as a child.  She has been evaluated by urology at some point, but has not been seen for years.  She otherwise feels well denies any fever, chills, malaise.    Patient Active Problem List    Diagnosis Date Noted   • Ganglion of left wrist 12/14/2016   • Radial styloid tenosynovitis 08/05/2015   • Thoracic or lumbosacral neuritis or radiculitis, unspecified 10/23/2014       Allergies:Septra [bactrim] and Other food    Current Outpatient Medications Ordered in Epic   Medication Sig Dispense Refill   • ciprofloxacin (CIPRO) 500 MG Tab Take 500 mg by mouth. FOR 7 DAYS  0   • hydroCHLOROthiazide (HYDRODIURIL) 25 MG Tab      • losartan (COZAAR) 25 MG Tab      • methocarbamol (ROBAXIN) 750 MG Tab      • metoprolol SR (TOPROL XL) 50 MG TABLET SR 24 HR      • topiramate (TOPAMAX) 50 MG tablet      • losartan (COZAAR) 50 MG Tab Take 50 mg by mouth every day.     • alprazolam (XANAX) 0.5 MG Tab Take 0.5 mg by mouth 3 times a day as needed for Anxiety.     • buprenorphine-naloxone (SUBOXONE) 8-2 MG SUBL Place 1 Tab under tongue 3 times a day. Film     • multivitamin (THERAGRAN) TABS Take 1 Tab by mouth every  morning.     • metoprolol (LOPRESSOR) 25 MG TABS Take 25 mg by mouth 2 times a day. Indications: High Blood Pressure     • duloxetine (CYMBALTA) 60 MG CPEP delayed-release capsule Take 60 mg by mouth every morning.       No current [facility]-ordered facility-administered medications on file.        Past Medical History:   Diagnosis Date   • Anesthesia     postop n/v   • Arthritis     lower back   • Bursitis of left hip    • Chronic fatigue    • Cold 11/15   • Fibromyalgia    • Hx MRSA infection 2007    left knee   • Hypertension     pt states well controlled on meds   • Neurogenic bladder    • Other specified disorder of intestines     constipation    • Other specified symptom associated with female genital organs    • Pain 10-10-14    low back,, left hip and leg, right foot, 6/10   • Pain 2/19/15    5/10 lower back   • Psychiatric problem     depression, anxiety, PTSD, panic disorder   • Pyelonephritis     h/o as a child   • Renal disorder     pyelonephritis as a child, hx of freq bladder infections   • Snoring    • Urinary bladder disorder     neurogenic bladder       Social History     Tobacco Use   • Smoking status: Never Smoker   • Smokeless tobacco: Never Used   Substance Use Topics   • Alcohol use: No   • Drug use: No       No family status information on file.   History reviewed. No pertinent family history.    ROS:  Review of Systems   Constitutional: Negative for fever, chills, weight loss, malaise, and fatigue.   HENT: Negative for ear pain, nosebleeds, congestion, sore throat and neck pain.    Eyes: Negative for vision changes.   Neuro: Negative for headache, sensory changes, weakness, seizure, LOC.   Cardiovascular: Negative for chest pain, palpitations, orthopnea and leg swelling.   Respiratory: Negative for cough, sputum production, shortness of breath and wheezing.   Gastrointestinal: Positive for intermittent left-sided and lower quadrant abdominal pain.  Negative for nausea, vomiting or diarrhea.  "  Genitourinary: Positive for frequency.  Negative for dysuria, urgency.  Musculoskeletal: Negative for falls, neck pain, back pain, joint pain, myalgias.   Skin: Negative for rash, diaphoresis.     Exam:  /78   Pulse 80   Temp 36.6 °C (97.8 °F) (Temporal)   Resp 12   Ht 1.6 m (5' 3\")   Wt 90.1 kg (198 lb 9.6 oz)   SpO2 98%   General: well-nourished, well-developed female in NAD  Head: normocephalic, atraumatic  Eyes: PERRLA, no conjunctival injection, acuity grossly intact, lids normal.  Ears: normal shape and symmetry, no tenderness, no discharge. External canals are without any significant edema or erythema. Tympanic membranes are without any inflammation, no effusion. Gross auditory acuity is intact.  Nose: symmetrical without tenderness, no discharge.  Mouth/Throat: reasonable hygiene, no erythema, exudates or tonsillar enlargement.  Neck: no masses, range of motion within normal limits, no tracheal deviation. No obvious thyroid enlargement.   Lymph: no cervical adenopathy. No supraclavicular adenopathy.   Neuro: alert and oriented. Cranial nerves 1-12 grossly intact. No sensory deficit.   Cardiovascular: regular rate and rhythm. No edema.  Pulmonary: no distress. Chest is symmetrical with respiration, no wheezes, crackles, or rhonchi.   Abdomen: soft, very mild tenderness to left lower quadrant and groin, no guarding, no hepatosplenomegaly.  No CVA tenderness.  Musculoskeletal: no clubbing, appropriate muscle tone, gait is stable.  Skin: warm, dry, intact, no clubbing, no cyanosis, no rashes.   Psych: appropriate mood, affect, judgement.         Assessment/Plan:  1. History of recurrent UTIs  POCT Urinalysis    US-RENAL   2. Urinary urgency  US-RENAL   3. Left lateral abdominal pain  US-RENAL       Urine is negative today for any signs of infectious process.  She does have some tenderness to her right lateral side, renal ultrasound is ordered for today, will call patient with results.  Supportive " care, differential diagnoses, and indications for immediate follow-up discussed with patient.   Pathogenesis of diagnosis discussed including typical length and natural progression.   Instructed to return to clinic or nearest emergency department for any change in condition, further concerns, or worsening of symptoms.  Patient states understanding of the plan of care and discharge instructions.  Instructed to make an appointment, for follow up, with her primary care provider.        Please note that this dictation was created using voice recognition software. I have made every reasonable attempt to correct obvious errors, but I expect that there are errors of grammar and possibly content that I did not discover before finalizing the note.      YVETTE Kirk.

## 2019-12-26 RX ORDER — METHOCARBAMOL 750 MG/1
750 TABLET, FILM COATED ORAL 3 TIMES DAILY PRN
COMMUNITY
End: 2022-03-16

## 2019-12-26 NOTE — OR NURSING
Pre-admit on telephone today.   Patient on Suboxone, I told patient to continue unless she hears from anesthesia.  I call [facility], and spoke to Chandrika in scheduling. I relayed information, and that our guidelines say to continue for minor cases. She will have anesthesia call patient about dose for the morning of surgery, or if she needs to stop it ahead of time. I gave her patient's cell number, they are coming to [place] day before surgery.

## 2019-12-30 ENCOUNTER — ANESTHESIA EVENT (OUTPATIENT)
Dept: SURGERY | Facility: MEDICAL CENTER | Age: 44
End: 2019-12-30
Payer: COMMERCIAL

## 2019-12-31 ENCOUNTER — APPOINTMENT (OUTPATIENT)
Dept: RADIOLOGY | Facility: MEDICAL CENTER | Age: 44
End: 2019-12-31
Attending: ORTHOPAEDIC SURGERY
Payer: COMMERCIAL

## 2019-12-31 ENCOUNTER — ANESTHESIA (OUTPATIENT)
Dept: SURGERY | Facility: MEDICAL CENTER | Age: 44
End: 2019-12-31
Payer: COMMERCIAL

## 2019-12-31 ENCOUNTER — HOSPITAL ENCOUNTER (OUTPATIENT)
Facility: MEDICAL CENTER | Age: 44
End: 2019-12-31
Attending: ORTHOPAEDIC SURGERY | Admitting: ORTHOPAEDIC SURGERY
Payer: COMMERCIAL

## 2019-12-31 VITALS
DIASTOLIC BLOOD PRESSURE: 77 MMHG | TEMPERATURE: 97.5 F | OXYGEN SATURATION: 94 % | HEART RATE: 73 BPM | WEIGHT: 194.45 LBS | HEIGHT: 63 IN | SYSTOLIC BLOOD PRESSURE: 128 MMHG | RESPIRATION RATE: 16 BRPM | BODY MASS INDEX: 34.45 KG/M2

## 2019-12-31 LAB
ANION GAP SERPL CALC-SCNC: 10 MMOL/L (ref 0–11.9)
BUN SERPL-MCNC: 24 MG/DL (ref 8–22)
CALCIUM SERPL-MCNC: 9.5 MG/DL (ref 8.5–10.5)
CHLORIDE SERPL-SCNC: 101 MMOL/L (ref 96–112)
CO2 SERPL-SCNC: 26 MMOL/L (ref 20–33)
CREAT SERPL-MCNC: 0.71 MG/DL (ref 0.5–1.4)
GLUCOSE SERPL-MCNC: 95 MG/DL (ref 65–99)
HCG UR QL: NEGATIVE
POTASSIUM SERPL-SCNC: 3.3 MMOL/L (ref 3.6–5.5)
SODIUM SERPL-SCNC: 137 MMOL/L (ref 135–145)

## 2019-12-31 PROCEDURE — 700102 HCHG RX REV CODE 250 W/ 637 OVERRIDE(OP)

## 2019-12-31 PROCEDURE — 700111 HCHG RX REV CODE 636 W/ 250 OVERRIDE (IP)

## 2019-12-31 PROCEDURE — 160048 HCHG OR STATISTICAL LEVEL 1-5: Performed by: ORTHOPAEDIC SURGERY

## 2019-12-31 PROCEDURE — 700102 HCHG RX REV CODE 250 W/ 637 OVERRIDE(OP): Performed by: ANESTHESIOLOGY

## 2019-12-31 PROCEDURE — A9270 NON-COVERED ITEM OR SERVICE: HCPCS

## 2019-12-31 PROCEDURE — 700105 HCHG RX REV CODE 258: Performed by: ORTHOPAEDIC SURGERY

## 2019-12-31 PROCEDURE — 160047 HCHG PACU  - EA ADDL 30 MINS PHASE II: Performed by: ORTHOPAEDIC SURGERY

## 2019-12-31 PROCEDURE — 73620 X-RAY EXAM OF FOOT: CPT | Mod: RT

## 2019-12-31 PROCEDURE — 700111 HCHG RX REV CODE 636 W/ 250 OVERRIDE (IP): Performed by: ANESTHESIOLOGY

## 2019-12-31 PROCEDURE — 80048 BASIC METABOLIC PNL TOTAL CA: CPT

## 2019-12-31 PROCEDURE — 81025 URINE PREGNANCY TEST: CPT

## 2019-12-31 PROCEDURE — A9270 NON-COVERED ITEM OR SERVICE: HCPCS | Performed by: ANESTHESIOLOGY

## 2019-12-31 PROCEDURE — 160025 RECOVERY II MINUTES (STATS): Performed by: ORTHOPAEDIC SURGERY

## 2019-12-31 PROCEDURE — 160009 HCHG ANES TIME/MIN: Performed by: ORTHOPAEDIC SURGERY

## 2019-12-31 PROCEDURE — 160029 HCHG SURGERY MINUTES - 1ST 30 MINS LEVEL 4: Performed by: ORTHOPAEDIC SURGERY

## 2019-12-31 PROCEDURE — 501838 HCHG SUTURE GENERAL: Performed by: ORTHOPAEDIC SURGERY

## 2019-12-31 PROCEDURE — 502000 HCHG MISC OR IMPLANTS RC 0278: Performed by: ORTHOPAEDIC SURGERY

## 2019-12-31 PROCEDURE — 160041 HCHG SURGERY MINUTES - EA ADDL 1 MIN LEVEL 4: Performed by: ORTHOPAEDIC SURGERY

## 2019-12-31 PROCEDURE — E0114 CRUTCH UNDERARM PAIR NO WOOD: HCPCS | Performed by: ORTHOPAEDIC SURGERY

## 2019-12-31 PROCEDURE — 160002 HCHG RECOVERY MINUTES (STAT): Performed by: ORTHOPAEDIC SURGERY

## 2019-12-31 PROCEDURE — 700101 HCHG RX REV CODE 250: Performed by: ANESTHESIOLOGY

## 2019-12-31 PROCEDURE — A6222 GAUZE <=16 IN NO W/SAL W/O B: HCPCS | Performed by: ORTHOPAEDIC SURGERY

## 2019-12-31 PROCEDURE — 700111 HCHG RX REV CODE 636 W/ 250 OVERRIDE (IP): Performed by: ORTHOPAEDIC SURGERY

## 2019-12-31 PROCEDURE — A6454 SELF-ADHER BAND W>=3" <5"/YD: HCPCS | Performed by: ORTHOPAEDIC SURGERY

## 2019-12-31 PROCEDURE — 160036 HCHG PACU - EA ADDL 30 MINS PHASE I: Performed by: ORTHOPAEDIC SURGERY

## 2019-12-31 PROCEDURE — 160022 HCHG BLOCK: Performed by: ORTHOPAEDIC SURGERY

## 2019-12-31 PROCEDURE — 160046 HCHG PACU - 1ST 60 MINS PHASE II: Performed by: ORTHOPAEDIC SURGERY

## 2019-12-31 PROCEDURE — 160035 HCHG PACU - 1ST 60 MINS PHASE I: Performed by: ORTHOPAEDIC SURGERY

## 2019-12-31 PROCEDURE — 500881 HCHG PACK, EXTREMITY: Performed by: ORTHOPAEDIC SURGERY

## 2019-12-31 DEVICE — IMPLANTABLE DEVICE: Type: IMPLANTABLE DEVICE | Site: FOOT | Status: FUNCTIONAL

## 2019-12-31 RX ORDER — HYDRALAZINE HYDROCHLORIDE 20 MG/ML
5 INJECTION INTRAMUSCULAR; INTRAVENOUS
Status: DISCONTINUED | OUTPATIENT
Start: 2019-12-31 | End: 2019-12-31 | Stop reason: HOSPADM

## 2019-12-31 RX ORDER — OXYCODONE HCL 5 MG/5 ML
5 SOLUTION, ORAL ORAL
Status: COMPLETED | OUTPATIENT
Start: 2019-12-31 | End: 2019-12-31

## 2019-12-31 RX ORDER — SODIUM CHLORIDE, SODIUM LACTATE, POTASSIUM CHLORIDE, CALCIUM CHLORIDE 600; 310; 30; 20 MG/100ML; MG/100ML; MG/100ML; MG/100ML
INJECTION, SOLUTION INTRAVENOUS CONTINUOUS
Status: DISCONTINUED | OUTPATIENT
Start: 2019-12-31 | End: 2019-12-31 | Stop reason: HOSPADM

## 2019-12-31 RX ORDER — HALOPERIDOL 5 MG/ML
1 INJECTION INTRAMUSCULAR
Status: DISCONTINUED | OUTPATIENT
Start: 2019-12-31 | End: 2019-12-31 | Stop reason: HOSPADM

## 2019-12-31 RX ORDER — MIDAZOLAM HYDROCHLORIDE 1 MG/ML
INJECTION INTRAMUSCULAR; INTRAVENOUS PRN
Status: DISCONTINUED | OUTPATIENT
Start: 2019-12-31 | End: 2019-12-31 | Stop reason: SURG

## 2019-12-31 RX ORDER — ONDANSETRON 2 MG/ML
4 INJECTION INTRAMUSCULAR; INTRAVENOUS
Status: DISCONTINUED | OUTPATIENT
Start: 2019-12-31 | End: 2019-12-31 | Stop reason: HOSPADM

## 2019-12-31 RX ORDER — LORAZEPAM 2 MG/ML
0.5 INJECTION INTRAMUSCULAR
Status: DISCONTINUED | OUTPATIENT
Start: 2019-12-31 | End: 2019-12-31 | Stop reason: HOSPADM

## 2019-12-31 RX ORDER — MEPERIDINE HYDROCHLORIDE 25 MG/ML
12.5 INJECTION INTRAMUSCULAR; INTRAVENOUS; SUBCUTANEOUS
Status: DISCONTINUED | OUTPATIENT
Start: 2019-12-31 | End: 2019-12-31 | Stop reason: HOSPADM

## 2019-12-31 RX ORDER — ACETAMINOPHEN 500 MG
1000 TABLET ORAL ONCE
Status: COMPLETED | OUTPATIENT
Start: 2019-12-31 | End: 2019-12-31

## 2019-12-31 RX ORDER — BUPIVACAINE HYDROCHLORIDE 5 MG/ML
INJECTION, SOLUTION EPIDURAL; INTRACAUDAL PRN
Status: DISCONTINUED | OUTPATIENT
Start: 2019-12-31 | End: 2019-12-31 | Stop reason: SURG

## 2019-12-31 RX ORDER — GABAPENTIN 300 MG/1
300 CAPSULE ORAL ONCE
Status: COMPLETED | OUTPATIENT
Start: 2019-12-31 | End: 2019-12-31

## 2019-12-31 RX ORDER — OXYCODONE HCL 5 MG/5 ML
10 SOLUTION, ORAL ORAL
Status: COMPLETED | OUTPATIENT
Start: 2019-12-31 | End: 2019-12-31

## 2019-12-31 RX ORDER — HYDROMORPHONE HYDROCHLORIDE 1 MG/ML
0.2 INJECTION, SOLUTION INTRAMUSCULAR; INTRAVENOUS; SUBCUTANEOUS
Status: DISCONTINUED | OUTPATIENT
Start: 2019-12-31 | End: 2019-12-31 | Stop reason: HOSPADM

## 2019-12-31 RX ORDER — LIDOCAINE HYDROCHLORIDE 10 MG/ML
INJECTION, SOLUTION EPIDURAL; INFILTRATION; INTRACAUDAL; PERINEURAL
Status: COMPLETED
Start: 2019-12-31 | End: 2019-12-31

## 2019-12-31 RX ORDER — SCOLOPAMINE TRANSDERMAL SYSTEM 1 MG/1
1 PATCH, EXTENDED RELEASE TRANSDERMAL
Status: DISCONTINUED | OUTPATIENT
Start: 2019-12-31 | End: 2019-12-31 | Stop reason: HOSPADM

## 2019-12-31 RX ORDER — HYDROMORPHONE HYDROCHLORIDE 1 MG/ML
0.4 INJECTION, SOLUTION INTRAMUSCULAR; INTRAVENOUS; SUBCUTANEOUS
Status: DISCONTINUED | OUTPATIENT
Start: 2019-12-31 | End: 2019-12-31 | Stop reason: HOSPADM

## 2019-12-31 RX ORDER — BUPIVACAINE HYDROCHLORIDE 5 MG/ML
INJECTION, SOLUTION PERINEURAL
Status: DISCONTINUED | OUTPATIENT
Start: 2019-12-31 | End: 2019-12-31 | Stop reason: HOSPADM

## 2019-12-31 RX ORDER — DIPHENHYDRAMINE HYDROCHLORIDE 50 MG/ML
12.5 INJECTION INTRAMUSCULAR; INTRAVENOUS
Status: DISCONTINUED | OUTPATIENT
Start: 2019-12-31 | End: 2019-12-31 | Stop reason: HOSPADM

## 2019-12-31 RX ORDER — CEFAZOLIN SODIUM 1 G/3ML
INJECTION, POWDER, FOR SOLUTION INTRAMUSCULAR; INTRAVENOUS PRN
Status: DISCONTINUED | OUTPATIENT
Start: 2019-12-31 | End: 2019-12-31 | Stop reason: SURG

## 2019-12-31 RX ORDER — DEXAMETHASONE SODIUM PHOSPHATE 4 MG/ML
INJECTION, SOLUTION INTRA-ARTICULAR; INTRALESIONAL; INTRAMUSCULAR; INTRAVENOUS; SOFT TISSUE PRN
Status: DISCONTINUED | OUTPATIENT
Start: 2019-12-31 | End: 2019-12-31 | Stop reason: SURG

## 2019-12-31 RX ORDER — ONDANSETRON 2 MG/ML
INJECTION INTRAMUSCULAR; INTRAVENOUS PRN
Status: DISCONTINUED | OUTPATIENT
Start: 2019-12-31 | End: 2019-12-31 | Stop reason: SURG

## 2019-12-31 RX ORDER — HYDROMORPHONE HYDROCHLORIDE 1 MG/ML
0.5 INJECTION, SOLUTION INTRAMUSCULAR; INTRAVENOUS; SUBCUTANEOUS
Status: DISCONTINUED | OUTPATIENT
Start: 2019-12-31 | End: 2019-12-31 | Stop reason: HOSPADM

## 2019-12-31 RX ORDER — LIDOCAINE HYDROCHLORIDE 20 MG/ML
INJECTION, SOLUTION EPIDURAL; INFILTRATION; INTRACAUDAL; PERINEURAL PRN
Status: DISCONTINUED | OUTPATIENT
Start: 2019-12-31 | End: 2019-12-31 | Stop reason: SURG

## 2019-12-31 RX ORDER — OXYCODONE HCL 5 MG/5 ML
SOLUTION, ORAL ORAL
Status: COMPLETED
Start: 2019-12-31 | End: 2019-12-31

## 2019-12-31 RX ADMIN — FENTANYL CITRATE 100 MCG: 50 INJECTION, SOLUTION INTRAMUSCULAR; INTRAVENOUS at 10:25

## 2019-12-31 RX ADMIN — PROPOFOL 250 MCG/KG/MIN: 10 INJECTION, EMULSION INTRAVENOUS at 10:25

## 2019-12-31 RX ADMIN — PROPOFOL 250 MG: 10 INJECTION, EMULSION INTRAVENOUS at 10:25

## 2019-12-31 RX ADMIN — SCOPALAMINE 1 PATCH: 1 PATCH, EXTENDED RELEASE TRANSDERMAL at 09:39

## 2019-12-31 RX ADMIN — DEXAMETHASONE SODIUM PHOSPHATE 8 MG: 4 INJECTION, SOLUTION INTRA-ARTICULAR; INTRALESIONAL; INTRAMUSCULAR; INTRAVENOUS; SOFT TISSUE at 10:29

## 2019-12-31 RX ADMIN — GABAPENTIN 300 MG: 300 CAPSULE ORAL at 09:39

## 2019-12-31 RX ADMIN — DEXAMETHASONE SODIUM PHOSPHATE 2 MG: 4 INJECTION, SOLUTION INTRA-ARTICULAR; INTRALESIONAL; INTRAMUSCULAR; INTRAVENOUS; SOFT TISSUE at 10:08

## 2019-12-31 RX ADMIN — HYDROMORPHONE HYDROCHLORIDE 0.4 MG: 1 INJECTION, SOLUTION INTRAMUSCULAR; INTRAVENOUS; SUBCUTANEOUS at 12:09

## 2019-12-31 RX ADMIN — ACETAMINOPHEN 1000 MG: 500 TABLET ORAL at 09:39

## 2019-12-31 RX ADMIN — FENTANYL CITRATE 25 MCG: 0.05 INJECTION, SOLUTION INTRAMUSCULAR; INTRAVENOUS at 11:50

## 2019-12-31 RX ADMIN — ONDANSETRON 4 MG: 2 INJECTION INTRAMUSCULAR; INTRAVENOUS at 10:57

## 2019-12-31 RX ADMIN — CEFAZOLIN 2 G: 330 INJECTION, POWDER, FOR SOLUTION INTRAMUSCULAR; INTRAVENOUS at 10:20

## 2019-12-31 RX ADMIN — LIDOCAINE HYDROCHLORIDE 80 MG: 20 INJECTION, SOLUTION EPIDURAL; INFILTRATION; INTRACAUDAL at 10:25

## 2019-12-31 RX ADMIN — BUPIVACAINE HYDROCHLORIDE 20 ML: 5 INJECTION, SOLUTION EPIDURAL; INTRACAUDAL; PERINEURAL at 10:08

## 2019-12-31 RX ADMIN — OXYCODONE HYDROCHLORIDE 10 MG: 5 SOLUTION ORAL at 12:24

## 2019-12-31 RX ADMIN — FENTANYL CITRATE 50 MCG: 50 INJECTION, SOLUTION INTRAMUSCULAR; INTRAVENOUS at 11:45

## 2019-12-31 RX ADMIN — SODIUM CHLORIDE, POTASSIUM CHLORIDE, SODIUM LACTATE AND CALCIUM CHLORIDE: 600; 310; 30; 20 INJECTION, SOLUTION INTRAVENOUS at 10:57

## 2019-12-31 RX ADMIN — HYDROMORPHONE HYDROCHLORIDE 0.4 MG: 1 INJECTION, SOLUTION INTRAMUSCULAR; INTRAVENOUS; SUBCUTANEOUS at 12:23

## 2019-12-31 RX ADMIN — LIDOCAINE HYDROCHLORIDE 0.5 ML: 10 INJECTION, SOLUTION EPIDURAL; INFILTRATION; INTRACAUDAL at 09:51

## 2019-12-31 RX ADMIN — FENTANYL CITRATE 25 MCG: 0.05 INJECTION, SOLUTION INTRAMUSCULAR; INTRAVENOUS at 11:38

## 2019-12-31 RX ADMIN — Medication 0.5 ML: at 09:51

## 2019-12-31 RX ADMIN — MIDAZOLAM 2 MG: 1 INJECTION INTRAMUSCULAR; INTRAVENOUS at 10:06

## 2019-12-31 RX ADMIN — SODIUM CHLORIDE, POTASSIUM CHLORIDE, SODIUM LACTATE AND CALCIUM CHLORIDE: 600; 310; 30; 20 INJECTION, SOLUTION INTRAVENOUS at 09:51

## 2019-12-31 RX ADMIN — FENTANYL CITRATE 100 MCG: 50 INJECTION, SOLUTION INTRAMUSCULAR; INTRAVENOUS at 10:41

## 2019-12-31 ASSESSMENT — PAIN SCALES - GENERAL: PAIN_LEVEL: 2

## 2019-12-31 NOTE — ANESTHESIA PREPROCEDURE EVALUATION
Chronic back pain, on Suboxone (will continue medication), PONV. Denies: MI/CHF/smoking/CVA/DM/CKD      Relevant Problems   No relevant active problems       Physical Exam    Airway   Mallampati: II  TM distance: >3 FB  Neck ROM: full       Cardiovascular - normal exam  Rhythm: regular  Rate: normal  (-) murmur     Dental - normal exam         Pulmonary - normal exam  Breath sounds clear to auscultation     Abdominal    Neurological - normal exam                 Anesthesia Plan    ASA 2       Plan - general and peripheral nerve block     Peripheral nerve block will be post-op pain control  Airway plan will be LMA  (Will do nerve block as patient is on suboxone and desires to avoid additional opioid medications)      Induction: intravenous    Postoperative Plan: Postoperative administration of opioids is intended.    Pertinent diagnostic labs and testing reviewed    Informed Consent:    Anesthetic plan and risks discussed with patient.    Use of blood products discussed with: patient whom consented to blood products.

## 2019-12-31 NOTE — DISCHARGE INSTRUCTIONS
ACTIVITY: Rest and take it easy for the first 24 hours.  A responsible adult is recommended to remain with you during that time.  It is normal to feel sleepy.  We encourage you to not do anything that requires balance, judgment or coordination.    MILD FLU-LIKE SYMPTOMS ARE NORMAL. YOU MAY EXPERIENCE GENERALIZED MUSCLE ACHES, THROAT IRRITATION, HEADACHE AND/OR SOME NAUSEA.    FOR 24 HOURS DO NOT:  Drive, operate machinery or run household appliances.  Drink beer or alcoholic beverages.   Make important decisions or sign legal documents.    SPECIAL INSTRUCTIONS: Weight bearing as tolerated LLE in shoe  Ice and elevate  Keep dressing clean and dry  Stay ahead of pain  *    DIET: To avoid nausea, slowly advance diet as tolerated, avoiding spicy or greasy foods for the first day.  Add more substantial food to your diet according to your physician's instructions.  Babies can be fed formula or breast milk as soon as they are hungry.  INCREASE FLUIDS AND FIBER TO AVOID CONSTIPATION.    SURGICAL DRESSING/BATHING: See above instructions    FOLLOW-UP APPOINTMENT:  A follow-up appointment should be arranged with your doctor in 1-2 weeks; call to schedule.    You should CALL YOUR PHYSICIAN if you develop:  Fever greater than 101 degrees F.  Pain not relieved by medication, or persistent nausea or vomiting.  Excessive bleeding (blood soaking through dressing) or unexpected drainage from the wound.  Extreme redness or swelling around the incision site, drainage of pus or foul smelling drainage.  Inability to urinate or empty your bladder within 8 hours.  Problems with breathing or chest pain.    You should call 911 if you develop problems with breathing or chest pain.  If you are unable to contact your doctor or surgical center, you should go to the nearest emergency room or urgent care center.  Physician's telephone #: [phone]    If any questions arise, call your doctor.  If your doctor is not available, please feel free  to call the Surgical Center at [phone].  The Center is open Monday through Friday from 7AM to 7PM.  You can also call the HEALTH HOTLINE open 24 hours/day, 7 days/week and speak to a nurse at [phone], or toll free at [phone].    A registered nurse may call you a few days after your surgery to see how you are doing after your procedure.    MEDICATIONS: Resume taking daily medication.  Take prescribed pain medication with food.  If no medication is prescribed, you may take non-aspirin pain medication if needed.  PAIN MEDICATION CAN BE VERY CONSTIPATING.  Take a stool softener or laxative such as senokot, pericolace, or milk of magnesia if needed.     Last pain medication given at 11:37.    If your physician has prescribed pain medication that includes Acetaminophen (Tylenol), do not take additional Acetaminophen (Tylenol) while taking the prescribed medication.    Depression / Suicide Risk    As you are discharged from this [facility] facility, it is important to learn how to keep safe from harming yourself.    Recognize the warning signs:  · Abrupt changes in personality, positive or negative- including increase in energy   · Giving away possessions  · Change in eating patterns- significant weight changes-  positive or negative  · Change in sleeping patterns- unable to sleep or sleeping all the time   · Unwillingness or inability to communicate  · Depression  · Unusual sadness, discouragement and loneliness  · Talk of wanting to die  · Neglect of personal appearance   · Rebelliousness- reckless behavior  · Withdrawal from people/activities they love  · Confusion- inability to concentrate     If you or a loved one observes any of these behaviors or has concerns about self-harm, here's what you can do:  · Talk about it- your feelings and reasons for harming yourself  · Remove any means that you might use to hurt yourself (examples: pills, rope, extension cords, firearm)  · Get professional help  from the community (Mental Health, Substance Abuse, psychological counseling)  · Do not be alone:Call your Safe Contact- someone whom you trust who will be there for you.  · Call your local CRISIS HOTLINE 213-8391 or 344-002-0948  · Call your local Children's Mobile Crisis Response Team [place] (739) 857-9990 or www.[employer]  · Call the toll free National Suicide Prevention Hotlines   · National Suicide Prevention Lifeline 623-088-WUGK (9314)  · National Hope Line Network 800-SUICIDE (344-8019)

## 2019-12-31 NOTE — OR NURSING
The pt is awake and oriented. Respirations are regular and easy. Pain is controlled, the pt is comfortable. Dressing dry and intact. updated.

## 2019-12-31 NOTE — OP REPORT
DATE OF SERVICE:  12/31/2019    PREOPERATIVE DIAGNOSES:  1.  Right second webspace neuroma.  2.  Right second metatarsalgia.  3.  Right contractured second metatarsophalangeal joint.    POSTOPERATIVE DIAGNOSES:  1.  Right second webspace neuroma.  2.  Right second metatarsalgia.  3.  Right contractured second metatarsophalangeal joint.    PROCEDURES PERFORMED:  1.  Right second neuroma excision.  2.  Right second MTP capsulotomy.  3.  Right distal metatarsal osteotomy.    SURGEON:  Amos Nolasco MD    ASSISTANT:  DANE Forte    ANESTHESIA:  General with peripheral nerve block requested by me for   postoperative pain control.    FINDINGS:  Neuroma, prominent second metatarsal head, contractured   metatarsophalangeal joint.    IMPLANTS:  Jeferson 12 mm snap-off screw.    COMPLICATIONS:  None.    OUTCOME:  To PACU in stable condition.    HISTORY OF PRESENT ILLNESS:  This is a pleasant 44-year-old female with pain   in her forefoot, refractory to conservative management.  She is indicated for   the above stated procedure.  She was greeted in the preoperative holding area   and identified by name and medical record number.  The right lower extremity   was marked.  Risks of the procedure including bleeding, infection, pain,   continuation of symptoms, recurrence, and need for more surgery were   discussed.  She provided a written consent.    DESCRIPTION OF PROCEDURE:  She was taken to operating room, placed on the   table in supine position.  Preoperative antibiotics were administered.    General anesthesia was induced.  Nonsterile tourniquet was placed on her right   thigh.  Right lower extremity prepped and draped in the usual sterile   fashion.  An operative pause was undertaken, where all present were in   agreement with patient identification, laterality, and procedure to be   performed.  The limb was elevated for 5 minutes, tourniquet raised to 250   mmHg.    Second webspace neuroma excision:  A 2 cm  longitudinal incision was made in   the second webspace.  Blunt dissection carried down between the metatarsal   heads, which were spread with a Weitlaner retractor.  The intermetatarsal   ligament was found to be quite scarred.  We placed a [place] elevator beneath it   and transected it with a 15 blade.  The neuroma was evident, was injected   with 0.5% Marcaine without epinephrine, both proximal and distal to the turn.    There was significant amount of surrounding fat.  The neuroma was transected   with Bovie electrocautery with a [place] tip, both proximal and distal to the   neuroma, was passed off.  This area was copiously irrigated.    Second MTP capsulotomy:  The second metatarsophalangeal joint was found to be   contractured.  Blunt dissection was carried to the dorsal aspect of it and a   T-shaped capsulotomy was performed.  This relaxed the joint significantly and   I did not feel that an extensor tendon lengthening was necessary.    Second distal metatarsal osteotomy:  An oscillating saw was used to make an   oblique cut from dorsal distal to proximal plantar.  It was then secured in a   shortened fashion with a 12 mm snap-off screw.  The overhanging bone was   removed with a rongeur.  Radiographically, this improved the cascade   significantly.  The second MTP joint relaxed nicely and remained well reduced.    No evidence of interval complication.  The tourniquet was let down.    Hemostasis was achieved.  The incision and the joint were copiously irrigated.    The subcutaneous layer was closed with 3-0 Monocryl in a buried interrupted   fashion, skin closed with 3-0 nylon in horizontal mattress fashion.  An   Adaptic gauze and a compressive wrap were placed.  The patient was awakened   and extubated in stable condition.    POSTOPERATIVE COURSE:  She will be discharged home today assuming adequate   pain control and mobilization, weightbearing as tolerated to the right lower   extremity in a  postoperative shoe.  I will see her in 10-14 days for suture   removal and wound check.       ____________________________________     MD MARTA SHAIKH / SHADIA    DD:  12/31/2019 11:18:47  DT:  12/31/2019 11:52:22    D#:  [id number]  Job#:  [id number]

## 2019-12-31 NOTE — PROGRESS NOTES
Pt AOx4,, drsg to rt foot in post op shoe c/d/i, toes are warm and pink with brisk cap refill, able to wiggle toes, states numbness, pain level tolerable at this time.  Tolerating sips of fluids, discharge instructions given to pt and , all questions answered, discharged to home

## 2019-12-31 NOTE — ANESTHESIA TIME REPORT
Anesthesia Start and Stop Event Times     Date Time Event    12/31/2019 1002 Ready for Procedure     1020 Anesthesia Start     1107 Anesthesia Stop        Responsible Staff  12/31/19    Name Role Begin End    Florencio Blackman M.D. Anesth 1020 1107        Preop Diagnosis (Free Text):  Pre-op Diagnosis     WEISS'S NEUROMA RIGHT        Preop Diagnosis (Codes):    Post op Diagnosis  Weiss's neuroma of second interspace of right foot  right, second metatarsalgia    Premium Reason  Non-Premium    Comments:

## 2019-12-31 NOTE — ANESTHESIA POSTPROCEDURE EVALUATION
Patient: Santa Carter    Procedure Summary     Date:  12/31/19 Room / Location:  [place] [id number] / SURGERY [facility]    Anesthesia Start:  1020 Anesthesia Stop:  1107    Procedures:       EXCISION, BURLESON'S NEUROMA-2ND WEBSPACE (Right Foot)      CAPSULOTOMY-2ND METATARSAL (Right Foot)      OSTEOTOMY, ORTHOPEDIC-DISTAL METATARSAL (Right Foot) Diagnosis:  (BURLESON'S NEUROMA RIGHT)    Surgeon:  Amos Nolasco M.D. Responsible Provider:  Florencio Blackman M.D.    Anesthesia Type:  general, peripheral nerve block ASA Status:  2          Final Anesthesia Type: general, peripheral nerve block  Last vitals  BP   Blood Pressure: 121/76    Temp   36.4 °C (97.5 °F)    Pulse   Pulse: 79   Resp   16    SpO2   96 %      Anesthesia Post Evaluation    Patient location during evaluation: PACU  Patient participation: complete - patient participated  Level of consciousness: awake and alert  Pain score: 2    Airway patency: patent  Anesthetic complications: no  Cardiovascular status: hemodynamically stable  Respiratory status: acceptable  Hydration status: euvolemic    PONV: none           Nurse Pain Score: 2 (NPRS)

## 2019-12-31 NOTE — ANESTHESIA PROCEDURE NOTES
Airway  Date/Time: 12/31/2019 10:26 AM  Performed by: Florencio Blackman M.D.  Authorized by: Florencio Blackman M.D.     Location:  OR  Urgency:  Elective  Indications for Airway Management:  Anesthesia  Spontaneous Ventilation: absent    Sedation Level:  Deep  Preoxygenated: Yes    Final Airway Type:  Supraglottic airway  Final Supraglottic Airway:  Standard LMA  SGA Size:  4  Number of Attempts at Approach:  1   Atraumatic insertion, good seal

## 2019-12-31 NOTE — OR SURGEON
Immediate Post OP Note    PreOp Diagnosis: Right second laguna neuroma, right second metatarsalgia    PostOp Diagnosis: Same    Procedure(s):  EXCISION, LAGUNA'S NEUROMA-2ND WEBSPACE - Wound Class: Clean  CAPSULOTOMY-2ND METATARSAL - Wound Class: Clean  OSTEOTOMY, ORTHOPEDIC-DISTAL METATARSAL - Wound Class: Clean    Surgeon(s):  Amos Nolasco M.D.    Anesthesiologist/Type of Anesthesia:  Anesthesiologist: Florencio Blackman M.D./General    Surgical Staff:  Assistant: CELESTE Nuno  Circulator: Jayshree Alex RGUERO; Tomasa Taylor RGUERO  Scrub Person: Pardeep Christopher    Specimens removed if any:  * No specimens in log *    Estimated Blood Loss: 5cc    TT: 20 min @ 250mmHg    Findings: Neuroma, contracted second MTP    Complications: none        12/31/2019 11:13 AM Amos Nolasco M.D.

## 2019-12-31 NOTE — ANESTHESIA PROCEDURE NOTES
Peripheral Block  Date/Time: 12/31/2019 10:06 AM  Performed by: Florencio Blackman M.D.  Authorized by: Florencio Blackman M.D.     Patient Location:  OR  Start Time:  12/31/2019 10:06 AM  End Time:  12/31/2019 10:09 AM  Reason for Block: at surgeon's request and post-op pain management    patient identified, IV checked, site marked, risks and benefits discussed, surgical consent, monitors and equipment checked, pre-op evaluation and timeout performed    Patient Position:  Supine  Prep: ChloraPrep    Monitoring:  Heart rate, continuous pulse ox and cardiac monitor  Block Region:  Lower Extremity  Lower Extremity - Block Type:  SCIATIC nerve block, lateral approach    Laterality:  Right  Procedures: ultrasound guided  Image captured, interpreted and electronically stored.  Local Infiltration:  Lidocaine  Strength:  1 %  Dose:  3 ml  Block Type:  Single-shot  Needle Length:  100mm  Needle Gauge:  21 G  Needle Localization:  Ultrasound guidance  Injection Assessment:  Negative aspiration for heme, no paresthesia on injection, incremental injection and local visualized surrounding nerve on ultrasound  Evidence of intravascular injection: No     US Guided Sciatic Nerve Block   US probe placed several cm proximal to popliteal crease on posterior thigh and scanned caudad and cephalad until Sciatic Nerve (SN) identified superficial/lateral to popliteal artery.  Needle inserted lateral to probe in an in plane approach under direct visualization to a perineural position.  After negative aspiration LA injected with ease and visualized surrounding the SN.    In PACU, patient was reporting some moderate aching discomfort in the right foot. The initial block still appeared to be helping with her pain, but in order to get her pain under better control I performed a re-block (right sciatic) using a stronger concentration of local anesthetic (10ml of 0.5% bupivacaine) using the same technique as described above. There were no  apparent complications and her pain improved after the re-block.

## 2020-01-08 ENCOUNTER — TELEPHONE (OUTPATIENT)
Dept: VASCULAR LAB | Facility: MEDICAL CENTER | Age: 45
End: 2020-01-08

## 2020-01-08 NOTE — TELEPHONE ENCOUNTER
[facility]    Pt's son called back upset they have not been able to reach our clinic.  They state Aisha from [facility] told the pt they have a DVT and should follow up with our clinic.  I don't see a referral, uncertain if this is anticoag or vascular.  Pt made an appt for 1/10/20 and I will request the referral from [facility] or from [facility].     Juliano Pinzon, AngieD

## 2020-01-08 NOTE — TELEPHONE ENCOUNTER
S/with son Anshu. Pt was sleeping at the moment. Left cb information and advised for pt to contact us asp as referral is Urgent and openings fill quickly.

## 2020-01-10 ENCOUNTER — TELEPHONE (OUTPATIENT)
Dept: VASCULAR LAB | Facility: MEDICAL CENTER | Age: 45
End: 2020-01-10

## 2020-01-10 ENCOUNTER — ANTICOAGULATION VISIT (OUTPATIENT)
Dept: VASCULAR LAB | Facility: MEDICAL CENTER | Age: 45
End: 2020-01-10
Attending: INTERNAL MEDICINE
Payer: COMMERCIAL

## 2020-01-10 DIAGNOSIS — I82.451 DEEP VENOUS THROMBOSIS (DVT) OF RIGHT PERONEAL VEIN, UNSPECIFIED CHRONICITY (HCC): ICD-10-CM

## 2020-01-10 NOTE — PROGRESS NOTES
Pt is new to Xarelto and new to [facility].  Discussed indication for drug therapy.  Explained our services, hours of operation, Xarelto therapy, potential SE, potential DI.  Discussed monitoring parameters, such as blood in urine, blood in stool, discussed what to do if a dose is missed, or suspected as missed.  Pt to continue with current Xarelto dosing regimen. Pt denies any unusual s/s of bleeding, bruising, clotting or any changes to diet or medications.      Discussed risks of pregnancy while on anticoagulation.    Confirmed pt is not on anti anti-seizure medications.    Added [facility]tico[facility]     Target end date: TBD  Indication: DVT lower extremity   Current Regimen: 15 mg BID until 01/29  Drug: Xarelto   Adherence with DOAC Therapy  Pt has 0 missed any doses in the average week    Bleeding Risk Assessment  Pt denies any excessive or unusual bleeding/hematomas.  Pt denies any GI bleeds or hematemesis.  Pt denies any concerning daily headache or sub dural hematoma symptoms.    Pt denies any hematuria or abnormal vaginal bleeding    Creatinine Clearance/Renal Function  Latest ClCr > 60 mL/min     Drug Interactions  Platelets: 234  ASA/other antiplatelets: Patient was taking Excedrin for headaches but now will stop  NSAID: Patient was taking Ibuprofen but will now stop  Verified no anticonvulsant or azole therapy, education provided for future use.     Examination  Blood Pressure 119/05  Symptomatic hypotension No  Significant gait impairment/imbalance/high risk for falls? None noticed     Final Assessment and Recommendations:  Patient appears stable from the anticoagulation staindpoint.    Benefits of continued DOAC therapy outweigh risks for this patient  Recommend pt continue with current DOAC therapy: Yes 15 mg twice daily until 12/29 where patient will be seen and will be switched to 20 mg daily.    Patient on 12/31 underwent surgery at [facility], excision laguna,s neuroma 2nd webspa and capsulotomy  2nd metatarsal. Patient has had significant time not moving around due to dressing on foot after the procedure. Patient reported feeling a cramp in her right calf muscle on 01/06 and also slight erythema that her  thought was a bug bite. The cramping of the right calf became even worse on 01/07 to the point where the patient could no longer walk so the patient went to urgent care. Patient had a venous doppler US performed which revealed the peroneal veins and the gastrocnemius were thrombosed. Patient started on 15 mg BID dosing on 01/07 but only took one dose at night. Patient began the BID dosing on 01/08. Patient has not missed a dose. Patient reports no bleeding events yet on Xarelto and no pertinent side effects. Patient will be seen again on 01/29 to assess that she is still tolerating Xarelto then patient will be transitioned to 20 mg once daily dosing as long as her CrCl remains above 50 mL/min.       Other Actions: cmp/ cbc hemogram ordered prior to next visit    Follow up:  Will follow up with patient in 3 weeks. (01/29)

## 2020-01-11 NOTE — TELEPHONE ENCOUNTER
Initial anticoag note and most recent u/s reviewed.  Patient with below knee dvt apparently provoked by recent surgery.  Pending further recs from pcp, we will continue with 3 months of anticoagulation.    Michael Bloch, MD  Anticoagulation Clinic    Cc:      FAVIO Benavides

## 2020-01-29 ENCOUNTER — ANTICOAGULATION VISIT (OUTPATIENT)
Dept: VASCULAR LAB | Facility: MEDICAL CENTER | Age: 45
End: 2020-01-29
Attending: INTERNAL MEDICINE
Payer: COMMERCIAL

## 2020-01-29 VITALS — SYSTOLIC BLOOD PRESSURE: 135 MMHG | HEART RATE: 80 BPM | DIASTOLIC BLOOD PRESSURE: 89 MMHG

## 2020-01-29 DIAGNOSIS — I82.451 DEEP VENOUS THROMBOSIS (DVT) OF RIGHT PERONEAL VEIN, UNSPECIFIED CHRONICITY (HCC): ICD-10-CM

## 2020-01-29 PROBLEM — I82.409 DEEP VEIN THROMBOSIS (HCC): Status: ACTIVE | Noted: 2020-01-29

## 2020-01-29 LAB
INR BLD: 1.2 (ref 0.9–1.2)
INR PPP: 1.2 (ref 2–3.5)

## 2020-01-29 PROCEDURE — 99212 OFFICE O/P EST SF 10 MIN: CPT

## 2020-01-29 PROCEDURE — 85610 PROTHROMBIN TIME: CPT

## 2020-01-29 NOTE — PROGRESS NOTES
Target end date:4/7/20     Indication: DVT     Drug: Xarelto    Health Status Since Last Assessment   Patient denies any new relevant medical problems, ED visits or hospitalizations   Patient denies any embolic events (stroke/tia/systemic embolism)    Adherence with DOAC Therapy   Pt has not missed any doses in the average week    Bleeding Risk Assessment     Pt has experienced increased menstrual bleeding as well as duration. Going on weeks two of bleeding. Bleeding is slowing.      Pt denies any GI bleeds or hematemesis.  Pt denies any concerning daily headache or sub dural hematoma symptoms.     Pt denies any hematuria or abnormal vaginal bleeding.   Latest Hemoglobin from 2015, new one ordered today.    ETOH overuse none     Creatinine Clearance/Renal Function     Latest ClCr ~100mL/min    Hepatic function   Latest LFTs needs new labs.    Pt denies any history of liver dysfunction      Drug Interactions   Platelets: none   ASA/other antiplatelets none   NSAID none   Other drug interactions none   Verified no anticonvulsant or azole therapy, education provided for future use.     Examination   Blood Pressure See vitals.   Symptomatic hypotension none    Significant gait impairment/imbalance/high risk for falls? No obvious concerns.     Final Assessment and Recommendations:   Patient appears stable from the anticoagulation staindpoint.     Benefits of continued DOAC therapy outweigh risks for this patient   Recommend pt continue with current DOAC therapy      Other Actions: cmp/ cbc hemogram ordered prior to next visit    Follow up:   Will follow up with patient 2  months.     Pt requests our clinic to provide LOT.  She is unable to come to the clinic on a Monday to see Dr Muniz, therefore we will have her seen on a different day with Vascular.     Juliano Pinzon, PharmD

## 2020-04-08 ENCOUNTER — OFFICE VISIT (OUTPATIENT)
Dept: VASCULAR LAB | Facility: MEDICAL CENTER | Age: 45
End: 2020-04-08
Payer: COMMERCIAL

## 2020-04-08 DIAGNOSIS — I82.451 DEEP VENOUS THROMBOSIS (DVT) OF RIGHT PERONEAL VEIN, UNSPECIFIED CHRONICITY (HCC): ICD-10-CM

## 2020-04-08 DIAGNOSIS — I82.401 ACUTE DEEP VEIN THROMBOSIS (DVT) OF RIGHT LOWER EXTREMITY, UNSPECIFIED VEIN (HCC): ICD-10-CM

## 2020-04-08 PROCEDURE — 99443 PR PHYSICIAN TELEPHONE EVALUATION 21-30 MIN: CPT | Mod: CR | Performed by: NURSE PRACTITIONER

## 2020-04-08 NOTE — PROGRESS NOTES
Telephone Appointment Visit   As a means of avoiding spread of COVID-19, this visit is being conducted by telephone. This telephone visit was initiated by the patient and they verbally consented.    Time at start of call: 14:57    Reason for Call:  Symptom Follow-up  INITIAL VASCULAR VISIT FOR LENGTH OF THERAPY:        HPI:    VTE disease / Anticoagulation:   Current symptoms:  Denies current SOB, CP, cyanosis of digits, redness of extremities.Has current intermittent swelling but this was present prior to the DVT  Current antithrombotic agent: Xarelto 20 mg daily  Complications: none, tolerating well, but with increased menstrual bleeding   Date of initiation of anticoagulation: 1/8/2020  Adherence: reports complete, no missed doses      Pertinent VTE pmhx:   Date of Diagnosis: 1/7/2020  Type of Venous thromboembolic disease (VTE): DVT RT peroneal veins and gastronemius  Type of imaging; Venous doppler  Preceding/presenting symptoms: leg cramping   VTE tx course:Xarelto 15 BID for 21 days then 20 mg daily   Any personal VTE hx? No  Any family VTE hx? Yes, Details: Father  after hip replacement    UNPROVOKED VS PROVOKED:   Recent surgery ? Yes, Details: excision of Rt second laguna neuroma webspace and capsulotomy 2nd metatarsal  Recent trauma ? No  Smoker? No  Extended travel? No  Other periods of immobility? No  Other risk factors for VTE disease:  none  MEN:  Colorectal CA screening:no              Prostate CA screening: N\A   Hx of Testosterone therapy: N\A  WOMEN: Colorectal CA screening: no       Cervical CA screening: yes       Breast CA screening: yes       Any estrogen, testosterone HRT, E2 birth control, tamoxifene, raloxifene: no       Hx of recurrent miscarriages: yes-Still born and  1 miscarriage and 1 tubal pg   Hypercoaguability work-up completed?  NO  Hypercoag w/u (OK to check WHILE ON ANTICOAG)  • Elevated D-dimer?  not tested  • Factor V leiden?  not tested  • Factor II DNA analysis (prothrombin  gene mutation)? not tested  • Beta 2-glycoprotein-I aB IgG, IgM?  not tested  • Anticardiolipin antibodies?  not tested  Hypercoag w/u (OK to check 2 WEEKS AFTER STOPPING ANTICOAG)  • Protein C functional?  not tested  • Protein S functional? not tested  • Antithrombin III?  not tested  • Lupus anticoagulant?  not tested        Anti-Coagulation Plan:Pt has completed 3 months of AC therapy.     Discussed at length the risks and benefits of long term anticoagulation. Per ACCP guidelines patient with first VTE that is an unprovoked who have a low or moderate bleeding risk, extended anticoagulation therapy with no scheduled stop date (Grade 2 is recommended). Let the patient know there is a    Major Provoking Risk Factor (surgery etc)    3% at 3 years    Minor Provoking Risk Factor (plane flight, etc)    15% at 3 years    Unprovoked   30% at 5 years    Malignancy Associated    15% yearly (probably >50% at 3 years)    - the risk is always higher (even in provoked) than someone who has never had a clot (about 0.5% each year)  - risk of recurrence is higher in above vs below knee  - risk of recurrence is higher with larger clot burden.    Discussed that there is a risk for bleeding while on any anticoagulant. Discussed that the risk of recurrence with the risk of clinically significant bleeding is 1% per year for low to moderate bleeding risk - but can be many times higher for patients at high risk of bleeding.   Discussed the guidelines with the pt that anticoagulation should be stopped after 3 months of therapy in pts with an acute, proximal  DVT provoked by surgery rather than shorter or longer treatment course (Grade 1B)   The first time VTE appears provoked in this pt. Pt is very anxious about going off of the medication and would like to have a venous duplex done first.     She will let us know if there are any changes to her condition or if patient wishes to go off the medication. Discussed s/sx of recurrent VTE and  when to seek immediate care. If any bleeding lasting greater than 30 minutes, hematuria or blood in stool, she will go to the ER.      -Continue Xarelto 20 mg until after the venous duplex -Then will either transition to lower dose of Xarelto or to  mg for the next 3 month then drop to ASA 81 mg.   - recommended to follow-up with your PCP to discuss if you need any age-appropriate cancer screenings as a small % of blood clots may be caused by an underlying malignancy  - elevate legs as much as possible, use compression stockings/socks if directed by your provider  - discussed signs and symptoms of return of blood clots including worsening leg swelling or pain, chest pain, shortness of breath, pain with deep inhalation and when to seek immediate care  - if any bleeding lasting 30min without stopping, please seek care with your PCP, urgent care, or ED  - if having any invasive procedure,please make sure the doctor knows of your history of blood clots and current anticoagulation status  - Rx for 18-30mmHg knee-high compression stockings provided, instructed to wear as much as tolerated       Follow-up: Will call the results of the venous duplex and then determine what she will do next.   Time at end of call: 15:22  Total Time Spent: 21-30 minutes    DIVINA Youssef.

## 2020-04-17 ENCOUNTER — HOSPITAL ENCOUNTER (OUTPATIENT)
Dept: RADIOLOGY | Facility: MEDICAL CENTER | Age: 45
End: 2020-04-17
Attending: NURSE PRACTITIONER
Payer: COMMERCIAL

## 2020-04-17 PROCEDURE — 93971 EXTREMITY STUDY: CPT | Mod: RT

## 2020-04-17 PROCEDURE — 93971 EXTREMITY STUDY: CPT | Mod: 26 | Performed by: INTERNAL MEDICINE

## 2020-04-20 ENCOUNTER — TELEPHONE (OUTPATIENT)
Dept: VASCULAR LAB | Facility: MEDICAL CENTER | Age: 45
End: 2020-04-20

## 2020-04-20 DIAGNOSIS — I82.401 ACUTE DEEP VEIN THROMBOSIS (DVT) OF RIGHT LOWER EXTREMITY, UNSPECIFIED VEIN (HCC): ICD-10-CM

## 2020-04-20 NOTE — TELEPHONE ENCOUNTER
Spoke with the pt regarding the results of the venous duplex. Informed that she is DVT free and since she has been on the Xarelto for 3 months she can go off of the medication and transition onto ASA 81 mg dose. Pt will need to go back on the medication when she is having hip surgery.She will contact our office when that time comes.  DIVINA Youssef.

## 2020-09-08 ENCOUNTER — HOSPITAL ENCOUNTER (OUTPATIENT)
Dept: RADIOLOGY | Facility: MEDICAL CENTER | Age: 45
End: 2020-09-08
Payer: COMMERCIAL

## 2020-09-15 ENCOUNTER — HOSPITAL ENCOUNTER (OUTPATIENT)
Dept: RADIOLOGY | Facility: MEDICAL CENTER | Age: 45
End: 2020-09-15
Attending: NURSE PRACTITIONER
Payer: COMMERCIAL

## 2020-09-15 ENCOUNTER — HOSPITAL ENCOUNTER (OUTPATIENT)
Dept: RADIOLOGY | Facility: MEDICAL CENTER | Age: 45
End: 2020-09-15
Attending: PHYSICIAN ASSISTANT
Payer: COMMERCIAL

## 2020-09-15 ENCOUNTER — HOSPITAL ENCOUNTER (OUTPATIENT)
Dept: RADIOLOGY | Facility: MEDICAL CENTER | Age: 45
End: 2020-09-15
Attending: GENERAL PRACTICE
Payer: COMMERCIAL

## 2020-09-15 DIAGNOSIS — Z12.31 VISIT FOR SCREENING MAMMOGRAM: ICD-10-CM

## 2020-09-15 DIAGNOSIS — G57.61 MORTON'S NEUROMA OF RIGHT FOOT: ICD-10-CM

## 2020-09-15 DIAGNOSIS — M54.2 CERVICALGIA: ICD-10-CM

## 2020-09-15 DIAGNOSIS — M79.671 RIGHT FOOT PAIN: ICD-10-CM

## 2020-09-15 PROCEDURE — 72141 MRI NECK SPINE W/O DYE: CPT

## 2020-09-15 PROCEDURE — 73718 MRI LOWER EXTREMITY W/O DYE: CPT | Mod: RT

## 2020-09-15 PROCEDURE — 72050 X-RAY EXAM NECK SPINE 4/5VWS: CPT

## 2020-09-15 PROCEDURE — 77067 SCR MAMMO BI INCL CAD: CPT

## 2020-10-15 ENCOUNTER — PRE-ADMISSION TESTING (OUTPATIENT)
Dept: ADMISSIONS | Facility: MEDICAL CENTER | Age: 45
End: 2020-10-15
Attending: ORTHOPAEDIC SURGERY
Payer: COMMERCIAL
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
## 2020-10-20 ENCOUNTER — PRE-ADMISSION TESTING (OUTPATIENT)
Dept: ADMISSIONS | Facility: MEDICAL CENTER | Age: 45
End: 2020-10-20
Attending: ORTHOPAEDIC SURGERY
Payer: COMMERCIAL

## 2020-10-20 ENCOUNTER — PRE-ADMISSION TESTING (OUTPATIENT)
Dept: ADMISSIONS | Facility: MEDICAL CENTER | Age: 45
End: 2020-10-20
Attending: GENERAL PRACTICE
Payer: COMMERCIAL

## 2020-10-20 ENCOUNTER — HOSPITAL ENCOUNTER (OUTPATIENT)
Facility: MEDICAL CENTER | Age: 45
End: 2020-10-20
Attending: GENERAL PRACTICE
Payer: COMMERCIAL

## 2020-10-20 ENCOUNTER — HOSPITAL ENCOUNTER (OUTPATIENT)
Facility: MEDICAL CENTER | Age: 45
End: 2020-10-20
Attending: FAMILY MEDICINE
Payer: COMMERCIAL

## 2020-10-20 DIAGNOSIS — Z01.812 PRE-OPERATIVE LABORATORY EXAMINATION: ICD-10-CM

## 2020-10-20 DIAGNOSIS — Z01.812 PRE-PROCEDURAL LABORATORY EXAMINATION: ICD-10-CM

## 2020-10-20 LAB
25(OH)D3 SERPL-MCNC: 49 NG/ML (ref 30–100)
ALBUMIN SERPL BCP-MCNC: 4.4 G/DL (ref 3.2–4.9)
ALBUMIN/GLOB SERPL: 1.5 G/DL
ALP SERPL-CCNC: 71 U/L (ref 30–99)
ALT SERPL-CCNC: 39 U/L (ref 2–50)
ANION GAP SERPL CALC-SCNC: 10 MMOL/L (ref 7–16)
ANION GAP SERPL CALC-SCNC: 11 MMOL/L (ref 7–16)
AST SERPL-CCNC: 28 U/L (ref 12–45)
BASOPHILS # BLD AUTO: 0.4 % (ref 0–1.8)
BASOPHILS # BLD: 0.02 K/UL (ref 0–0.12)
BILIRUB SERPL-MCNC: 0.2 MG/DL (ref 0.1–1.5)
BUN SERPL-MCNC: 19 MG/DL (ref 8–22)
BUN SERPL-MCNC: 19 MG/DL (ref 8–22)
CALCIUM SERPL-MCNC: 9.7 MG/DL (ref 8.5–10.5)
CALCIUM SERPL-MCNC: 9.8 MG/DL (ref 8.5–10.5)
CHLORIDE SERPL-SCNC: 101 MMOL/L (ref 96–112)
CHLORIDE SERPL-SCNC: 99 MMOL/L (ref 96–112)
CHOLEST SERPL-MCNC: 226 MG/DL (ref 100–199)
CO2 SERPL-SCNC: 27 MMOL/L (ref 20–33)
CO2 SERPL-SCNC: 28 MMOL/L (ref 20–33)
COVID ORDER STATUS COVID19: NORMAL
CREAT SERPL-MCNC: 0.71 MG/DL (ref 0.5–1.4)
CREAT SERPL-MCNC: 0.76 MG/DL (ref 0.5–1.4)
CRP SERPL HS-MCNC: 4.7 MG/L (ref 0–7.5)
EOSINOPHIL # BLD AUTO: 0.06 K/UL (ref 0–0.51)
EOSINOPHIL NFR BLD: 1.2 % (ref 0–6.9)
ERYTHROCYTE [DISTWIDTH] IN BLOOD BY AUTOMATED COUNT: 43.8 FL (ref 35.9–50)
ERYTHROCYTE [DISTWIDTH] IN BLOOD BY AUTOMATED COUNT: 44.5 FL (ref 35.9–50)
EST. AVERAGE GLUCOSE BLD GHB EST-MCNC: 105 MG/DL
ESTRADIOL SERPL-MCNC: 57.1 PG/ML
GLOBULIN SER CALC-MCNC: 2.9 G/DL (ref 1.9–3.5)
GLUCOSE SERPL-MCNC: 102 MG/DL (ref 65–99)
GLUCOSE SERPL-MCNC: 106 MG/DL (ref 65–99)
HBA1C MFR BLD: 5.3 % (ref 0–5.6)
HCT VFR BLD AUTO: 49.2 % (ref 37–47)
HCT VFR BLD AUTO: 49.4 % (ref 37–47)
HCYS SERPL-SCNC: 6.13 UMOL/L
HDLC SERPL-MCNC: 55 MG/DL
HGB BLD-MCNC: 16.1 G/DL (ref 12–16)
HGB BLD-MCNC: 16.2 G/DL (ref 12–16)
IMM GRANULOCYTES # BLD AUTO: 0.02 K/UL (ref 0–0.11)
IMM GRANULOCYTES NFR BLD AUTO: 0.4 % (ref 0–0.9)
IRON SATN MFR SERPL: 15 % (ref 15–55)
IRON SERPL-MCNC: 53 UG/DL (ref 40–170)
LDLC SERPL CALC-MCNC: 142 MG/DL
LYMPHOCYTES # BLD AUTO: 1.15 K/UL (ref 1–4.8)
LYMPHOCYTES NFR BLD: 22.5 % (ref 22–41)
MCH RBC QN AUTO: 28.1 PG (ref 27–33)
MCH RBC QN AUTO: 28.4 PG (ref 27–33)
MCHC RBC AUTO-ENTMCNC: 32.6 G/DL (ref 33.6–35)
MCHC RBC AUTO-ENTMCNC: 32.9 G/DL (ref 33.6–35)
MCV RBC AUTO: 86.3 FL (ref 81.4–97.8)
MCV RBC AUTO: 86.4 FL (ref 81.4–97.8)
MONOCYTES # BLD AUTO: 0.4 K/UL (ref 0–0.85)
MONOCYTES NFR BLD AUTO: 7.8 % (ref 0–13.4)
NEUTROPHILS # BLD AUTO: 3.47 K/UL (ref 2–7.15)
NEUTROPHILS NFR BLD: 67.7 % (ref 44–72)
NRBC # BLD AUTO: 0 K/UL
NRBC BLD-RTO: 0 /100 WBC
PLATELET # BLD AUTO: 293 K/UL (ref 164–446)
PLATELET # BLD AUTO: 312 K/UL (ref 164–446)
PMV BLD AUTO: 8.9 FL (ref 9–12.9)
PMV BLD AUTO: 9.3 FL (ref 9–12.9)
POTASSIUM SERPL-SCNC: 3.8 MMOL/L (ref 3.6–5.5)
POTASSIUM SERPL-SCNC: 3.8 MMOL/L (ref 3.6–5.5)
PROGEST SERPL-MCNC: 2.66 NG/ML
PROLACTIN SERPL-MCNC: 8.81 NG/ML (ref 2.8–26)
PROT SERPL-MCNC: 7.3 G/DL (ref 6–8.2)
RBC # BLD AUTO: 5.7 M/UL (ref 4.2–5.4)
RBC # BLD AUTO: 5.72 M/UL (ref 4.2–5.4)
SARS-COV-2 RNA RESP QL NAA+PROBE: NOTDETECTED
SODIUM SERPL-SCNC: 137 MMOL/L (ref 135–145)
SODIUM SERPL-SCNC: 139 MMOL/L (ref 135–145)
SPECIMEN SOURCE: NORMAL
T3FREE SERPL-MCNC: 3 PG/ML (ref 2–4.4)
T4 FREE SERPL-MCNC: 0.94 NG/DL (ref 0.93–1.7)
TESTOST SERPL-MCNC: 23 NG/DL (ref 9–75)
THYROPEROXIDASE AB SERPL-ACNC: 9 IU/ML (ref 0–9)
TIBC SERPL-MCNC: 344 UG/DL (ref 250–450)
TRIGL SERPL-MCNC: 146 MG/DL (ref 0–149)
TSH SERPL DL<=0.005 MIU/L-ACNC: 2.5 UIU/ML (ref 0.38–5.33)
UIBC SERPL-MCNC: 291 UG/DL (ref 110–370)
WBC # BLD AUTO: 5.1 K/UL (ref 4.8–10.8)
WBC # BLD AUTO: 5.1 K/UL (ref 4.8–10.8)

## 2020-10-20 PROCEDURE — 80061 LIPID PANEL: CPT

## 2020-10-20 PROCEDURE — U0003 INFECTIOUS AGENT DETECTION BY NUCLEIC ACID (DNA OR RNA); SEVERE ACUTE RESPIRATORY SYNDROME CORONAVIRUS 2 (SARS-COV-2) (CORONAVIRUS DISEASE [COVID-19]), AMPLIFIED PROBE TECHNIQUE, MAKING USE OF HIGH THROUGHPUT TECHNOLOGIES AS DESCRIBED BY CMS-2020-01-R: HCPCS

## 2020-10-20 PROCEDURE — 85025 COMPLETE CBC W/AUTO DIFF WBC: CPT

## 2020-10-20 PROCEDURE — 84443 ASSAY THYROID STIM HORMONE: CPT

## 2020-10-20 PROCEDURE — 80048 BASIC METABOLIC PNL TOTAL CA: CPT

## 2020-10-20 PROCEDURE — 82679 ASSAY OF ESTRONE: CPT

## 2020-10-20 PROCEDURE — 84439 ASSAY OF FREE THYROXINE: CPT

## 2020-10-20 PROCEDURE — 83540 ASSAY OF IRON: CPT

## 2020-10-20 PROCEDURE — 84481 FREE ASSAY (FT-3): CPT

## 2020-10-20 PROCEDURE — 84403 ASSAY OF TOTAL TESTOSTERONE: CPT

## 2020-10-20 PROCEDURE — 82306 VITAMIN D 25 HYDROXY: CPT

## 2020-10-20 PROCEDURE — 84140 ASSAY OF PREGNENOLONE: CPT

## 2020-10-20 PROCEDURE — 36415 COLL VENOUS BLD VENIPUNCTURE: CPT

## 2020-10-20 PROCEDURE — 83090 ASSAY OF HOMOCYSTEINE: CPT

## 2020-10-20 PROCEDURE — 83550 IRON BINDING TEST: CPT

## 2020-10-20 PROCEDURE — 84146 ASSAY OF PROLACTIN: CPT

## 2020-10-20 PROCEDURE — 83036 HEMOGLOBIN GLYCOSYLATED A1C: CPT

## 2020-10-20 PROCEDURE — 86376 MICROSOMAL ANTIBODY EACH: CPT

## 2020-10-20 PROCEDURE — 86141 C-REACTIVE PROTEIN HS: CPT

## 2020-10-20 PROCEDURE — 84144 ASSAY OF PROGESTERONE: CPT

## 2020-10-20 PROCEDURE — 83525 ASSAY OF INSULIN: CPT

## 2020-10-20 PROCEDURE — 82670 ASSAY OF TOTAL ESTRADIOL: CPT

## 2020-10-20 PROCEDURE — 80053 COMPREHEN METABOLIC PANEL: CPT

## 2020-10-20 PROCEDURE — 85027 COMPLETE CBC AUTOMATED: CPT

## 2020-10-22 LAB — INSULIN P FAST SERPL-ACNC: 15 UIU/ML (ref 3–19)

## 2020-10-25 LAB — ESTRONE SERPL-MCNC: 56.8 PG/ML

## 2020-10-27 ENCOUNTER — ANESTHESIA (OUTPATIENT)
Dept: SURGERY | Facility: MEDICAL CENTER | Age: 45
End: 2020-10-27
Payer: COMMERCIAL

## 2020-10-27 ENCOUNTER — APPOINTMENT (OUTPATIENT)
Dept: RADIOLOGY | Facility: MEDICAL CENTER | Age: 45
End: 2020-10-27
Attending: ORTHOPAEDIC SURGERY
Payer: COMMERCIAL

## 2020-10-27 ENCOUNTER — HOSPITAL ENCOUNTER (OUTPATIENT)
Facility: MEDICAL CENTER | Age: 45
End: 2020-10-27
Attending: ORTHOPAEDIC SURGERY | Admitting: ORTHOPAEDIC SURGERY
Payer: COMMERCIAL

## 2020-10-27 ENCOUNTER — ANESTHESIA EVENT (OUTPATIENT)
Dept: SURGERY | Facility: MEDICAL CENTER | Age: 45
End: 2020-10-27
Payer: COMMERCIAL

## 2020-10-27 VITALS
RESPIRATION RATE: 16 BRPM | DIASTOLIC BLOOD PRESSURE: 82 MMHG | WEIGHT: 207.89 LBS | SYSTOLIC BLOOD PRESSURE: 125 MMHG | TEMPERATURE: 97.4 F | HEIGHT: 63 IN | HEART RATE: 91 BPM | OXYGEN SATURATION: 96 % | BODY MASS INDEX: 36.84 KG/M2

## 2020-10-27 LAB
COVID ORDER STATUS COVID19: NORMAL
EKG IMPRESSION: NORMAL
HCG UR QL: NEGATIVE
SARS-COV-2 RDRP RESP QL NAA+PROBE: NOTDETECTED
SPECIMEN SOURCE: NORMAL

## 2020-10-27 PROCEDURE — A6222 GAUZE <=16 IN NO W/SAL W/O B: HCPCS | Performed by: ORTHOPAEDIC SURGERY

## 2020-10-27 PROCEDURE — 81025 URINE PREGNANCY TEST: CPT

## 2020-10-27 PROCEDURE — U0004 COV-19 TEST NON-CDC HGH THRU: HCPCS

## 2020-10-27 PROCEDURE — 160048 HCHG OR STATISTICAL LEVEL 1-5: Performed by: ORTHOPAEDIC SURGERY

## 2020-10-27 PROCEDURE — 160009 HCHG ANES TIME/MIN: Performed by: ORTHOPAEDIC SURGERY

## 2020-10-27 PROCEDURE — A6454 SELF-ADHER BAND W>=3" <5"/YD: HCPCS | Performed by: ORTHOPAEDIC SURGERY

## 2020-10-27 PROCEDURE — 700105 HCHG RX REV CODE 258: Performed by: ORTHOPAEDIC SURGERY

## 2020-10-27 PROCEDURE — 93010 ELECTROCARDIOGRAM REPORT: CPT | Performed by: INTERNAL MEDICINE

## 2020-10-27 PROCEDURE — 500881 HCHG PACK, EXTREMITY: Performed by: ORTHOPAEDIC SURGERY

## 2020-10-27 PROCEDURE — 160041 HCHG SURGERY MINUTES - EA ADDL 1 MIN LEVEL 4: Performed by: ORTHOPAEDIC SURGERY

## 2020-10-27 PROCEDURE — 700101 HCHG RX REV CODE 250: Performed by: ORTHOPAEDIC SURGERY

## 2020-10-27 PROCEDURE — 160002 HCHG RECOVERY MINUTES (STAT): Performed by: ORTHOPAEDIC SURGERY

## 2020-10-27 PROCEDURE — 700102 HCHG RX REV CODE 250 W/ 637 OVERRIDE(OP): Performed by: ORTHOPAEDIC SURGERY

## 2020-10-27 PROCEDURE — C9803 HOPD COVID-19 SPEC COLLECT: HCPCS | Performed by: ORTHOPAEDIC SURGERY

## 2020-10-27 PROCEDURE — 160029 HCHG SURGERY MINUTES - 1ST 30 MINS LEVEL 4: Performed by: ORTHOPAEDIC SURGERY

## 2020-10-27 PROCEDURE — 160046 HCHG PACU - 1ST 60 MINS PHASE II: Performed by: ORTHOPAEDIC SURGERY

## 2020-10-27 PROCEDURE — 160025 RECOVERY II MINUTES (STATS): Performed by: ORTHOPAEDIC SURGERY

## 2020-10-27 PROCEDURE — 501838 HCHG SUTURE GENERAL: Performed by: ORTHOPAEDIC SURGERY

## 2020-10-27 PROCEDURE — 64450 NJX AA&/STRD OTHER PN/BRANCH: CPT | Performed by: ORTHOPAEDIC SURGERY

## 2020-10-27 PROCEDURE — 64445 NJX AA&/STRD SCIATIC NRV IMG: CPT | Performed by: ORTHOPAEDIC SURGERY

## 2020-10-27 PROCEDURE — 700101 HCHG RX REV CODE 250: Performed by: ANESTHESIOLOGY

## 2020-10-27 PROCEDURE — 700111 HCHG RX REV CODE 636 W/ 250 OVERRIDE (IP): Performed by: ANESTHESIOLOGY

## 2020-10-27 PROCEDURE — A9270 NON-COVERED ITEM OR SERVICE: HCPCS | Performed by: ORTHOPAEDIC SURGERY

## 2020-10-27 PROCEDURE — 93005 ELECTROCARDIOGRAM TRACING: CPT | Performed by: ORTHOPAEDIC SURGERY

## 2020-10-27 PROCEDURE — 160035 HCHG PACU - 1ST 60 MINS PHASE I: Performed by: ORTHOPAEDIC SURGERY

## 2020-10-27 RX ORDER — BUPIVACAINE HYDROCHLORIDE 5 MG/ML
INJECTION, SOLUTION EPIDURAL; INTRACAUDAL
Status: DISCONTINUED | OUTPATIENT
Start: 2020-10-27 | End: 2020-10-27 | Stop reason: HOSPADM

## 2020-10-27 RX ORDER — CEFAZOLIN SODIUM 1 G/3ML
INJECTION, POWDER, FOR SOLUTION INTRAMUSCULAR; INTRAVENOUS PRN
Status: DISCONTINUED | OUTPATIENT
Start: 2020-10-27 | End: 2020-10-27 | Stop reason: SURG

## 2020-10-27 RX ORDER — BUPIVACAINE HYDROCHLORIDE 5 MG/ML
INJECTION, SOLUTION EPIDURAL; INTRACAUDAL
Status: COMPLETED | OUTPATIENT
Start: 2020-10-27 | End: 2020-10-27

## 2020-10-27 RX ORDER — ROPIVACAINE HYDROCHLORIDE 5 MG/ML
INJECTION, SOLUTION EPIDURAL; INFILTRATION; PERINEURAL
Status: COMPLETED | OUTPATIENT
Start: 2020-10-27 | End: 2020-10-27

## 2020-10-27 RX ORDER — SODIUM CHLORIDE, SODIUM LACTATE, POTASSIUM CHLORIDE, CALCIUM CHLORIDE 600; 310; 30; 20 MG/100ML; MG/100ML; MG/100ML; MG/100ML
INJECTION, SOLUTION INTRAVENOUS CONTINUOUS
Status: DISCONTINUED | OUTPATIENT
Start: 2020-10-27 | End: 2020-10-27 | Stop reason: HOSPADM

## 2020-10-27 RX ORDER — ONDANSETRON 2 MG/ML
4 INJECTION INTRAMUSCULAR; INTRAVENOUS
Status: DISCONTINUED | OUTPATIENT
Start: 2020-10-27 | End: 2020-10-27 | Stop reason: HOSPADM

## 2020-10-27 RX ORDER — LABETALOL HYDROCHLORIDE 5 MG/ML
5 INJECTION, SOLUTION INTRAVENOUS
Status: DISCONTINUED | OUTPATIENT
Start: 2020-10-27 | End: 2020-10-27 | Stop reason: HOSPADM

## 2020-10-27 RX ORDER — DEXAMETHASONE SODIUM PHOSPHATE 4 MG/ML
INJECTION, SOLUTION INTRA-ARTICULAR; INTRALESIONAL; INTRAMUSCULAR; INTRAVENOUS; SOFT TISSUE PRN
Status: DISCONTINUED | OUTPATIENT
Start: 2020-10-27 | End: 2020-10-27 | Stop reason: SURG

## 2020-10-27 RX ORDER — LORAZEPAM 2 MG/ML
0.5 INJECTION INTRAMUSCULAR
Status: DISCONTINUED | OUTPATIENT
Start: 2020-10-27 | End: 2020-10-27 | Stop reason: HOSPADM

## 2020-10-27 RX ORDER — DIPHENHYDRAMINE HYDROCHLORIDE 50 MG/ML
12.5 INJECTION INTRAMUSCULAR; INTRAVENOUS
Status: DISCONTINUED | OUTPATIENT
Start: 2020-10-27 | End: 2020-10-27 | Stop reason: HOSPADM

## 2020-10-27 RX ORDER — ONDANSETRON 2 MG/ML
INJECTION INTRAMUSCULAR; INTRAVENOUS PRN
Status: DISCONTINUED | OUTPATIENT
Start: 2020-10-27 | End: 2020-10-27 | Stop reason: SURG

## 2020-10-27 RX ORDER — LIDOCAINE 50 MG/G
1 PATCH TOPICAL DAILY
COMMUNITY
Start: 2020-09-22 | End: 2022-03-16

## 2020-10-27 RX ORDER — HALOPERIDOL 5 MG/ML
1 INJECTION INTRAMUSCULAR
Status: DISCONTINUED | OUTPATIENT
Start: 2020-10-27 | End: 2020-10-27 | Stop reason: HOSPADM

## 2020-10-27 RX ORDER — AMOXICILLIN 500 MG/1
500 CAPSULE ORAL
COMMUNITY
Start: 2020-10-19 | End: 2020-12-14

## 2020-10-27 RX ADMIN — PROPOFOL 200 MG: 10 INJECTION, EMULSION INTRAVENOUS at 07:02

## 2020-10-27 RX ADMIN — ONDANSETRON 8 MG: 2 INJECTION INTRAMUSCULAR; INTRAVENOUS at 07:02

## 2020-10-27 RX ADMIN — LIDOCAINE HYDROCHLORIDE 0.5 ML: 10 INJECTION, SOLUTION EPIDURAL; INFILTRATION; INTRACAUDAL at 06:33

## 2020-10-27 RX ADMIN — POVIDONE IODINE 15 ML: 100 SOLUTION TOPICAL at 06:33

## 2020-10-27 RX ADMIN — BUPIVACAINE HYDROCHLORIDE 10 ML: 5 INJECTION, SOLUTION EPIDURAL; INTRACAUDAL; PERINEURAL at 07:05

## 2020-10-27 RX ADMIN — SODIUM CHLORIDE, POTASSIUM CHLORIDE, SODIUM LACTATE AND CALCIUM CHLORIDE: 600; 310; 30; 20 INJECTION, SOLUTION INTRAVENOUS at 06:45

## 2020-10-27 RX ADMIN — DEXAMETHASONE SODIUM PHOSPHATE 8 MG: 4 INJECTION, SOLUTION INTRA-ARTICULAR; INTRALESIONAL; INTRAMUSCULAR; INTRAVENOUS; SOFT TISSUE at 07:02

## 2020-10-27 RX ADMIN — CEFAZOLIN 2 G: 330 INJECTION, POWDER, FOR SOLUTION INTRAMUSCULAR; INTRAVENOUS at 06:45

## 2020-10-27 RX ADMIN — ROPIVACAINE HYDROCHLORIDE 20 ML: 5 INJECTION, SOLUTION EPIDURAL; INFILTRATION; PERINEURAL at 06:42

## 2020-10-27 RX ADMIN — SODIUM CHLORIDE, POTASSIUM CHLORIDE, SODIUM LACTATE AND CALCIUM CHLORIDE: 600; 310; 30; 20 INJECTION, SOLUTION INTRAVENOUS at 06:37

## 2020-10-27 ASSESSMENT — PAIN SCALES - GENERAL: PAIN_LEVEL: 3

## 2020-10-27 ASSESSMENT — PAIN DESCRIPTION - PAIN TYPE
TYPE: SURGICAL PAIN

## 2020-10-27 ASSESSMENT — FIBROSIS 4 INDEX: FIB4 SCORE: 0.66

## 2020-10-27 NOTE — OR SURGEON
Immediate Post OP Note    PreOp Diagnosis: Right foot scar pain, residual neuroma, painful hardware    PostOp Diagnosis: Same    Procedure(s):  EXCISION, NEUROMA - Wound Class: Clean  EXCISION, SCAR - FOOT - Wound Class: Clean  REMOVAL, HARDWARE - Wound Class: Clean    Surgeon(s):  Amos Nolasco M.D.    Anesthesiologist/Type of Anesthesia:  Anesthesiologist: Andrew Peoples M.D./General    Surgical Staff:  Assistant: CELESTE Nuno  Circulator: Tomasa Taylor R.N.  Scrub Person: Yakelin Wang    Specimens removed if any:  * No specimens in log *    Estimated Blood Loss: 10cc    TT: 20 min @ 250mmHg    Findings: Scar, well healed metatarsal osteotomy, residual neuroma    Complications: None        10/27/2020 7:40 AM Amos Nolasco M.D.

## 2020-10-27 NOTE — ANESTHESIA PROCEDURE NOTES
Peripheral Block    Date/Time: 10/27/2020 7:05 AM  Performed by: Andrew Peoples M.D.  Authorized by: Andrew Peoples M.D.     Patient Location:  OR  Start Time:  10/27/2020 7:05 AM  Reason for Block: at surgeon's request and post-op pain management    patient identified, IV checked, site marked, risks and benefits discussed, surgical consent, monitors and equipment checked, pre-op evaluation and timeout performed    Patient Position:  Supine  Prep: ChloraPrep    Monitoring:  Heart rate, continuous pulse ox and cardiac monitor  Block Region:  Lower Extremity  Lower Extremity - Block Type:  Saphenous nerve block - OTHER peripheral nerve or branch    Laterality:  Right  Procedures: ultrasound guided  Image captured, interpreted and electronically stored.  Local Infiltration:  Lidocaine  Strength:  1 %  Dose:  3 ml  Block Type:  Single-shot  Needle Length:  100mm  Needle Gauge:  21 G  Needle Localization:  Ultrasound guidance  Injection Assessment:  Negative aspiration for heme, no paresthesia on injection, incremental injection and local visualized surrounding nerve on ultrasound  Evidence of intravascular injection: No

## 2020-10-27 NOTE — ANESTHESIA PROCEDURE NOTES
Airway    Date/Time: 10/27/2020 7:02 AM  Performed by: Andrew Peoples M.D.  Authorized by: Andrew Peoples M.D.     Location:  OR  Urgency:  Elective  Indications for Airway Management:  Anesthesia      Spontaneous Ventilation: absent    Sedation Level:  Deep  Preoxygenated: Yes    Final Airway Type:  Supraglottic airway  Final Supraglottic Airway:  Standard LMA    SGA Size:  4  Number of Attempts at Approach:  1

## 2020-10-27 NOTE — PROGRESS NOTES
Patient arrived in phase II, A&Ox4, pain is 6/10 but she declines medication, no complaints of nausea, dressing to right CDI.  Foot iced and elevated, CMS intact, cap refill <3 seconds.    Instructions, medication, and follow up appointment reviewed with patient and spouse, understanding verbalized.  Discharge instructions signed.

## 2020-10-27 NOTE — DISCHARGE INSTRUCTIONS
ACTIVITY: Rest and take it easy for the first 24 hours.  A responsible adult is recommended to remain with you during that time.  It is normal to feel sleepy.  We encourage you to not do anything that requires balance, judgment or coordination.    MILD FLU-LIKE SYMPTOMS ARE NORMAL. YOU MAY EXPERIENCE GENERALIZED MUSCLE ACHES, THROAT IRRITATION, HEADACHE AND/OR SOME NAUSEA.    FOR 24 HOURS DO NOT:  Drive, operate machinery or run household appliances.  Drink beer or alcoholic beverages.   Make important decisions or sign legal documents.    SPECIAL INSTRUCTIONS:   Weight Bearing As Tolerated Right leg in shoe  Remove shoe to move toes 3-4 times a day  Ice and elevate  Restart Xarelto today  Stay ahead of pain    DIET: To avoid nausea, slowly advance diet as tolerated, avoiding spicy or greasy foods for the first day.  Add more substantial food to your diet according to your physician's instructions.  Babies can be fed formula or breast milk as soon as they are hungry.  INCREASE FLUIDS AND FIBER TO AVOID CONSTIPATION.    SURGICAL DRESSING/BATHING: Keep dressing clean, dry and intact; no submerging in water until follow-up appointment    FOLLOW-UP APPOINTMENT:  A follow-up appointment should be arranged with your doctor in 1-2 weeks; call to schedule.    You should CALL YOUR PHYSICIAN if you develop:  Fever greater than 101 degrees F.  Pain not relieved by medication, or persistent nausea or vomiting.  Excessive bleeding (blood soaking through dressing) or unexpected drainage from the wound.  Extreme redness or swelling around the incision site, drainage of pus or foul smelling drainage.  Inability to urinate or empty your bladder within 8 hours.  Problems with breathing or chest pain.    You should call 911 if you develop problems with breathing or chest pain.  If you are unable to contact your doctor or surgical center, you should go to the nearest emergency room or urgent care center.  Physician's telephone #:   [place] [phone]    If any questions arise, call your doctor.  If your doctor is not available, please feel free to call the Surgical Center at [phone]. The Contact Center is open Monday through Friday 7AM to 5PM and may speak to a nurse at [phone], or toll free at [phone].     A registered nurse may call you a few days after your surgery to see how you are doing after your procedure.    MEDICATIONS: Resume taking daily medication.  Take prescribed pain medication with food.  If no medication is prescribed, you may take non-aspirin pain medication if needed.  PAIN MEDICATION CAN BE VERY CONSTIPATING.  Take a stool softener or laxative such as senokot, pericolace, or milk of magnesia if needed.    Any prescriptions written will be in your [facility] folder.      If your physician has prescribed pain medication that includes Acetaminophen (Tylenol), do not take additional Acetaminophen (Tylenol) while taking the prescribed medication.    Depression / Suicide Risk    As you are discharged from this [facility], it is important to learn how to keep safe from harming yourself.    Recognize the warning signs:  · Abrupt changes in personality, positive or negative- including increase in energy   · Giving away possessions  · Change in eating patterns- significant weight changes-  positive or negative  · Change in sleeping patterns- unable to sleep or sleeping all the time   · Unwillingness or inability to communicate  · Depression  · Unusual sadness, discouragement and loneliness  · Talk of wanting to die  · Neglect of personal appearance   · Rebelliousness- reckless behavior  · Withdrawal from people/activities they love  · Confusion- inability to concentrate     If you or a loved one observes any of these behaviors or has concerns about self-harm, here's what you can do:  · Talk about it- your feelings and reasons for harming yourself  · Remove any means that you might use to  hurt yourself (examples: pills, rope, extension cords, firearm)  · Get professional help from the community (Mental Health, Substance Abuse, psychological counseling)  · Do not be alone:Call your Safe Contact- someone whom you trust who will be there for you.  · Call your local CRISIS HOTLINE 066-8672 or 394-395-3737  · Call your local Children's Mobile Crisis Response Team [place] (972) 775-3088 or www.[employer]  · Call the toll free National Suicide Prevention Hotlines   · National Suicide Prevention Lifeline 451-260-TIYR (4561)  · National Hope Line Network 800-SUICIDE (487-0867)

## 2020-10-27 NOTE — ANESTHESIA PROCEDURE NOTES
Peripheral Block    Date/Time: 10/27/2020 6:42 AM  Performed by: Andrew Peoples M.D.  Authorized by: Andrew Peoples M.D.     Patient Location:  Pre-op  Start Time:  10/27/2020 6:42 AM  Reason for Block: at surgeon's request and post-op pain management    patient identified, IV checked, site marked, risks and benefits discussed, surgical consent, monitors and equipment checked, pre-op evaluation and timeout performed    Patient Position:  Supine  Prep: ChloraPrep    Monitoring:  Heart rate, continuous pulse ox and cardiac monitor  Block Region:  Lower Extremity  Lower Extremity - Block Type:  SCIATIC nerve block, lateral approach    Laterality:  Right  Procedures: ultrasound guided  Image captured, interpreted and electronically stored.  Local Infiltration:  Lidocaine  Strength:  1 %  Dose:  3 ml  Block Type:  Single-shot  Needle Length:  100mm  Needle Gauge:  21 G  Needle Localization:  Ultrasound guidance  Injection Assessment:  Negative aspiration for heme, no paresthesia on injection, incremental injection and local visualized surrounding nerve on ultrasound  Evidence of intravascular injection: No

## 2020-10-27 NOTE — ANESTHESIA PREPROCEDURE EVALUATION
Relevant Problems   CARDIAC   (+) Deep vein thrombosis (HCC)       Physical Exam    Airway   Mallampati: II  TM distance: >3 FB  Neck ROM: full       Cardiovascular - normal exam  Rhythm: regular  Rate: normal  (-) murmur     Dental - normal exam           Pulmonary - normal exam  Breath sounds clear to auscultation     Abdominal    Neurological - normal exam                 Anesthesia Plan    ASA 2       Plan - general and peripheral nerve block     Peripheral nerve block will be post-op pain control  Airway plan will be LMA        Induction: intravenous    Postoperative Plan: Postoperative administration of opioids is intended.    Pertinent diagnostic labs and testing reviewed    Informed Consent:    Anesthetic plan and risks discussed with patient.    Use of blood products discussed with: patient whom consented to blood products.

## 2020-10-28 NOTE — OP REPORT
DATE OF SERVICE:  10/27/2020    PREOPERATIVE DIAGNOSES:  1.  Right recurrent neuroma.  2.  Right painful second metatarsal hardware.  3.  Right foot painful scar.    POSTOPERATIVE DIAGNOSES:  1.  Right recurrent neuroma.  2.  Right painful second metatarsal hardware.  3.  Right foot painful scar.    PROCEDURES:  1.  Scar excision, right foot with irrigation and debridement.  2.  Right revision neuroma excision, second webspace.  3.  Removal of hardware, second metatarsal head.  4.  Second metatarsophalangeal capsulotomy.  5.  Second extensor digitorum longus tenolysis.    SURGEON:  Amos Nolasco MD    ASSISTANT:  DANE Forte    ANESTHESIA:  General with peripheral nerve block requested by me for   postoperative pain control.    ESTIMATED BLOOD LOSS:  10 mL    TOURNIQUET TIME:  20 minutes at 250 mmHg.    FINDINGS:  Healed metatarsal osteotomy scar tissue from the dorsum through the   plantar aspect of her foot with scar tissue in the area of her previous   neuroma.    COMPLICATIONS:  None.    OUTCOME:  PACU in stable condition.    HISTORY OF PRESENT ILLNESS:  This is a 45-year-old female who I previously did   a second webspace neuroma excision on as well as a second distal metatarsal   osteotomy.  She unfortunately has had persistent pain and hypersensitivity in   the area.  We tried to treat her nonoperatively with injections and massage   and she was not helpful.  She was greeted in the preoperative holding area and   identified by name and medical record number.  Right lower extremity was   marked.  Risks of procedure include bleeding, infection, pain, continuation of   symptoms, neurovascular damage, need for more surgery were discussed.  She   provided written consent.    DESCRIPTION OF PROCEDURE:  She was taken to the operating room, placed on   table in supine position.  Preoperative antibiotics were administered.    General anesthesia was induced.  Nonsterile tourniquet was placed on the  right   thigh.  Right lower extremity prepped and draped in the usual sterile   fashion.  An operative pause was undertaken, where all present were in   agreement with patient identification, laterality, and procedure to be   performed.  Right lower extremity prepped and draped in the usual sterile   fashion, operative pause.  We ellipsed out her previous second webspace   incision, we dissected over to the extensor tendon for the second toe, which   was incarcerated in scar.  We freed this up circumferentially.  We performed   an MTP capsulotomy.    We performed a T-shaped capsulotomy, which relaxed the joint nicely.  We used   a rongeur to remove bone overgrown over the snap-off screw.  This was able to   be removed without significant difficulty.  We took the joint through range of   motion.  The bone was found to be quite stable.  No significant cartilage   damage.  We then dissected between the metatarsal heads.  There was   significant amount of scar tissue.  They were spread with a Weitlaner   retractor.  We dissected deep to the stump neuroma, which was injected with   0.5% Marcaine without epinephrine.  It was then transected with [place] tip   Bovie electrocautery.  We then removed the significant amount of scar tissue   surrounding this area and some adipose tissue.  Care was taken not to violate   the skin on the plantar aspect of the foot.  The tourniquet was let down.    Hemostasis was achieved.  The incision copiously irrigated.  Subcutaneous   layer closed with 3-0 Monocryl in buried interrupted fashion, skin closed with   3-0 nylon in horizontal mattress fashion.  Adaptic gauze and a compressive   wrap were placed.  The patient was awakened and extubated in stable condition.    POSTOPERATIVE COURSE:  She will be discharged home today assuming adequate   pain control and mobilization, weightbearing as tolerated in a postoperative   shoe.  I would like her to come out of the shoe 3-4 times a day to  work on MTP   motion.  I will see her in 10-14 days for suture removal and wound check.  I   stressed the importance of therapy to work on scar massage and   desensitization.       ____________________________________     MD MARTA SHAIKH / SHADIA    DD:  10/27/2020 16:14:45  DT:  10/27/2020 17:36:36    D#:  [id number]  Job#:  [id number]

## 2020-10-31 LAB — PREG SERPL-MCNC: 48 NG/DL (ref 15–132)

## 2020-11-19 ENCOUNTER — HOSPITAL ENCOUNTER (OUTPATIENT)
Dept: RADIOLOGY | Facility: MEDICAL CENTER | Age: 45
End: 2020-11-19
Attending: PHYSICIAN ASSISTANT
Payer: COMMERCIAL

## 2020-11-19 DIAGNOSIS — M46.1 SACROILIITIS, NOT ELSEWHERE CLASSIFIED (HCC): ICD-10-CM

## 2020-11-19 DIAGNOSIS — M54.50 LOW BACK PAIN, UNSPECIFIED BACK PAIN LATERALITY, UNSPECIFIED CHRONICITY, UNSPECIFIED WHETHER SCIATICA PRESENT: ICD-10-CM

## 2020-11-19 DIAGNOSIS — M51.36 OTHER INTERVERTEBRAL DISC DEGENERATION, LUMBAR REGION: ICD-10-CM

## 2020-11-19 DIAGNOSIS — M54.50 ACUTE MIDLINE LOW BACK PAIN WITHOUT SCIATICA: ICD-10-CM

## 2020-11-19 PROCEDURE — 72148 MRI LUMBAR SPINE W/O DYE: CPT

## 2020-11-19 PROCEDURE — 72110 X-RAY EXAM L-2 SPINE 4/>VWS: CPT

## 2020-12-14 ENCOUNTER — PRE-ADMISSION TESTING (OUTPATIENT)
Dept: ADMISSIONS | Facility: MEDICAL CENTER | Age: 45
End: 2020-12-14
Attending: ORTHOPAEDIC SURGERY
Payer: COMMERCIAL

## 2020-12-14 DIAGNOSIS — Z01.812 PRE-OPERATIVE LABORATORY EXAMINATION: ICD-10-CM

## 2020-12-14 LAB
COVID ORDER STATUS COVID19: NORMAL
SARS-COV-2 RNA RESP QL NAA+PROBE: NOTDETECTED
SPECIMEN SOURCE: NORMAL

## 2020-12-14 PROCEDURE — U0003 INFECTIOUS AGENT DETECTION BY NUCLEIC ACID (DNA OR RNA); SEVERE ACUTE RESPIRATORY SYNDROME CORONAVIRUS 2 (SARS-COV-2) (CORONAVIRUS DISEASE [COVID-19]), AMPLIFIED PROBE TECHNIQUE, MAKING USE OF HIGH THROUGHPUT TECHNOLOGIES AS DESCRIBED BY CMS-2020-01-R: HCPCS

## 2020-12-14 PROCEDURE — C9803 HOPD COVID-19 SPEC COLLECT: HCPCS

## 2020-12-15 NOTE — OR NURSING
Jose L,    The procedure is deemed to be a minor procedure however postoperative pain control might be an issue especially if there are repairs involved. Patient should get her pain physician’s opinion as to decreasing and/or substituting something else for the Suboxone to forego its antagonist effect prior to her procedure as pain control will be very difficult to achieve with traditional means.    Samuel Baca M.D.  [facility] to patient indicating that she should contact her pain MD to come up with a plan for SUBOXONE. Informed of above info from Head Aesthesiologist (Dr Baca). Also emailed instructions for DOS and to call Pre-Op dept upon arrival since she will not be wearing a mask upon entering the hospital.

## 2020-12-17 ENCOUNTER — ANESTHESIA EVENT (OUTPATIENT)
Dept: SURGERY | Facility: MEDICAL CENTER | Age: 45
End: 2020-12-17
Payer: COMMERCIAL

## 2020-12-17 ENCOUNTER — ANESTHESIA (OUTPATIENT)
Dept: SURGERY | Facility: MEDICAL CENTER | Age: 45
End: 2020-12-17
Payer: COMMERCIAL

## 2020-12-17 ENCOUNTER — APPOINTMENT (OUTPATIENT)
Dept: RADIOLOGY | Facility: MEDICAL CENTER | Age: 45
End: 2020-12-17
Attending: ORTHOPAEDIC SURGERY
Payer: COMMERCIAL

## 2020-12-17 ENCOUNTER — HOSPITAL ENCOUNTER (OUTPATIENT)
Facility: MEDICAL CENTER | Age: 45
End: 2020-12-17
Attending: ORTHOPAEDIC SURGERY | Admitting: ORTHOPAEDIC SURGERY
Payer: COMMERCIAL

## 2020-12-17 VITALS
HEART RATE: 78 BPM | OXYGEN SATURATION: 91 % | DIASTOLIC BLOOD PRESSURE: 83 MMHG | TEMPERATURE: 97.3 F | RESPIRATION RATE: 18 BRPM | WEIGHT: 213.63 LBS | HEIGHT: 63 IN | SYSTOLIC BLOOD PRESSURE: 135 MMHG | BODY MASS INDEX: 37.85 KG/M2

## 2020-12-17 LAB — HCG UR QL: NEGATIVE

## 2020-12-17 PROCEDURE — A9270 NON-COVERED ITEM OR SERVICE: HCPCS | Performed by: ORTHOPAEDIC SURGERY

## 2020-12-17 PROCEDURE — 700111 HCHG RX REV CODE 636 W/ 250 OVERRIDE (IP): Performed by: ANESTHESIOLOGY

## 2020-12-17 PROCEDURE — 700101 HCHG RX REV CODE 250: Performed by: ORTHOPAEDIC SURGERY

## 2020-12-17 PROCEDURE — 160025 RECOVERY II MINUTES (STATS): Performed by: ORTHOPAEDIC SURGERY

## 2020-12-17 PROCEDURE — 160046 HCHG PACU - 1ST 60 MINS PHASE II: Performed by: ORTHOPAEDIC SURGERY

## 2020-12-17 PROCEDURE — 502240 HCHG MISC OR SUPPLY RC 0272: Performed by: ORTHOPAEDIC SURGERY

## 2020-12-17 PROCEDURE — 700105 HCHG RX REV CODE 258: Performed by: ORTHOPAEDIC SURGERY

## 2020-12-17 PROCEDURE — A9270 NON-COVERED ITEM OR SERVICE: HCPCS | Performed by: ANESTHESIOLOGY

## 2020-12-17 PROCEDURE — 700101 HCHG RX REV CODE 250: Performed by: ANESTHESIOLOGY

## 2020-12-17 PROCEDURE — 501838 HCHG SUTURE GENERAL: Performed by: ORTHOPAEDIC SURGERY

## 2020-12-17 PROCEDURE — 700102 HCHG RX REV CODE 250 W/ 637 OVERRIDE(OP): Performed by: ORTHOPAEDIC SURGERY

## 2020-12-17 PROCEDURE — 81025 URINE PREGNANCY TEST: CPT

## 2020-12-17 PROCEDURE — 700102 HCHG RX REV CODE 250 W/ 637 OVERRIDE(OP): Performed by: ANESTHESIOLOGY

## 2020-12-17 PROCEDURE — 160031 HCHG SURGERY MINUTES - 1ST 30 MINS LEVEL 5: Performed by: ORTHOPAEDIC SURGERY

## 2020-12-17 PROCEDURE — 160042 HCHG SURGERY MINUTES - EA ADDL 1 MIN LEVEL 5: Performed by: ORTHOPAEDIC SURGERY

## 2020-12-17 PROCEDURE — 502680 HCHG CANNULA, HIP TROCAR: Performed by: ORTHOPAEDIC SURGERY

## 2020-12-17 PROCEDURE — 160047 HCHG PACU  - EA ADDL 30 MINS PHASE II: Performed by: ORTHOPAEDIC SURGERY

## 2020-12-17 PROCEDURE — 160035 HCHG PACU - 1ST 60 MINS PHASE I: Performed by: ORTHOPAEDIC SURGERY

## 2020-12-17 PROCEDURE — 700111 HCHG RX REV CODE 636 W/ 250 OVERRIDE (IP): Performed by: ORTHOPAEDIC SURGERY

## 2020-12-17 PROCEDURE — 160002 HCHG RECOVERY MINUTES (STAT): Performed by: ORTHOPAEDIC SURGERY

## 2020-12-17 PROCEDURE — 502581 HCHG PACK, SHOULDER ARTHROSCOPY: Performed by: ORTHOPAEDIC SURGERY

## 2020-12-17 PROCEDURE — 160009 HCHG ANES TIME/MIN: Performed by: ORTHOPAEDIC SURGERY

## 2020-12-17 PROCEDURE — 160048 HCHG OR STATISTICAL LEVEL 1-5: Performed by: ORTHOPAEDIC SURGERY

## 2020-12-17 PROCEDURE — 160036 HCHG PACU - EA ADDL 30 MINS PHASE I: Performed by: ORTHOPAEDIC SURGERY

## 2020-12-17 RX ORDER — ONDANSETRON 2 MG/ML
INJECTION INTRAMUSCULAR; INTRAVENOUS PRN
Status: DISCONTINUED | OUTPATIENT
Start: 2020-12-17 | End: 2020-12-17 | Stop reason: SURG

## 2020-12-17 RX ORDER — HYDROMORPHONE HYDROCHLORIDE 1 MG/ML
0.1 INJECTION, SOLUTION INTRAMUSCULAR; INTRAVENOUS; SUBCUTANEOUS
Status: DISCONTINUED | OUTPATIENT
Start: 2020-12-17 | End: 2020-12-17 | Stop reason: HOSPADM

## 2020-12-17 RX ORDER — KETOROLAC TROMETHAMINE 30 MG/ML
INJECTION, SOLUTION INTRAMUSCULAR; INTRAVENOUS PRN
Status: DISCONTINUED | OUTPATIENT
Start: 2020-12-17 | End: 2020-12-17 | Stop reason: SURG

## 2020-12-17 RX ORDER — KETAMINE HYDROCHLORIDE 50 MG/ML
INJECTION, SOLUTION INTRAMUSCULAR; INTRAVENOUS PRN
Status: DISCONTINUED | OUTPATIENT
Start: 2020-12-17 | End: 2020-12-17 | Stop reason: SURG

## 2020-12-17 RX ORDER — HALOPERIDOL 5 MG/ML
1 INJECTION INTRAMUSCULAR
Status: DISCONTINUED | OUTPATIENT
Start: 2020-12-17 | End: 2020-12-17 | Stop reason: HOSPADM

## 2020-12-17 RX ORDER — GABAPENTIN 300 MG/1
300 CAPSULE ORAL ONCE
Status: COMPLETED | OUTPATIENT
Start: 2020-12-17 | End: 2020-12-17

## 2020-12-17 RX ORDER — ACETAMINOPHEN 500 MG
1000 TABLET ORAL ONCE
Status: COMPLETED | OUTPATIENT
Start: 2020-12-17 | End: 2020-12-17

## 2020-12-17 RX ORDER — MIDAZOLAM HYDROCHLORIDE 1 MG/ML
INJECTION INTRAMUSCULAR; INTRAVENOUS PRN
Status: DISCONTINUED | OUTPATIENT
Start: 2020-12-17 | End: 2020-12-17 | Stop reason: SURG

## 2020-12-17 RX ORDER — HYDROMORPHONE HYDROCHLORIDE 1 MG/ML
0.4 INJECTION, SOLUTION INTRAMUSCULAR; INTRAVENOUS; SUBCUTANEOUS
Status: DISCONTINUED | OUTPATIENT
Start: 2020-12-17 | End: 2020-12-17 | Stop reason: HOSPADM

## 2020-12-17 RX ORDER — LORAZEPAM 2 MG/ML
0.5 INJECTION INTRAMUSCULAR
Status: DISCONTINUED | OUTPATIENT
Start: 2020-12-17 | End: 2020-12-17 | Stop reason: HOSPADM

## 2020-12-17 RX ORDER — SCOLOPAMINE TRANSDERMAL SYSTEM 1 MG/1
1 PATCH, EXTENDED RELEASE TRANSDERMAL
Status: DISCONTINUED | OUTPATIENT
Start: 2020-12-17 | End: 2020-12-17 | Stop reason: HOSPADM

## 2020-12-17 RX ORDER — ROPIVACAINE HYDROCHLORIDE 5 MG/ML
INJECTION, SOLUTION EPIDURAL; INFILTRATION; PERINEURAL
Status: DISCONTINUED | OUTPATIENT
Start: 2020-12-17 | End: 2020-12-17 | Stop reason: HOSPADM

## 2020-12-17 RX ORDER — LIDOCAINE HYDROCHLORIDE 20 MG/ML
INJECTION, SOLUTION EPIDURAL; INFILTRATION; INTRACAUDAL; PERINEURAL PRN
Status: DISCONTINUED | OUTPATIENT
Start: 2020-12-17 | End: 2020-12-17 | Stop reason: SURG

## 2020-12-17 RX ORDER — DIPHENHYDRAMINE HYDROCHLORIDE 50 MG/ML
12.5 INJECTION INTRAMUSCULAR; INTRAVENOUS
Status: DISCONTINUED | OUTPATIENT
Start: 2020-12-17 | End: 2020-12-17 | Stop reason: HOSPADM

## 2020-12-17 RX ORDER — OXYCODONE HCL 5 MG/5 ML
5 SOLUTION, ORAL ORAL
Status: COMPLETED | OUTPATIENT
Start: 2020-12-17 | End: 2020-12-17

## 2020-12-17 RX ORDER — HYDROMORPHONE HYDROCHLORIDE 2 MG/ML
INJECTION, SOLUTION INTRAMUSCULAR; INTRAVENOUS; SUBCUTANEOUS PRN
Status: DISCONTINUED | OUTPATIENT
Start: 2020-12-17 | End: 2020-12-17 | Stop reason: HOSPADM

## 2020-12-17 RX ORDER — OXYCODONE HCL 5 MG/5 ML
10 SOLUTION, ORAL ORAL
Status: COMPLETED | OUTPATIENT
Start: 2020-12-17 | End: 2020-12-17

## 2020-12-17 RX ORDER — HYDROMORPHONE HYDROCHLORIDE 1 MG/ML
0.2 INJECTION, SOLUTION INTRAMUSCULAR; INTRAVENOUS; SUBCUTANEOUS
Status: DISCONTINUED | OUTPATIENT
Start: 2020-12-17 | End: 2020-12-17 | Stop reason: HOSPADM

## 2020-12-17 RX ORDER — ONDANSETRON 2 MG/ML
4 INJECTION INTRAMUSCULAR; INTRAVENOUS
Status: DISCONTINUED | OUTPATIENT
Start: 2020-12-17 | End: 2020-12-17 | Stop reason: HOSPADM

## 2020-12-17 RX ORDER — MEPERIDINE HYDROCHLORIDE 25 MG/ML
6.25 INJECTION INTRAMUSCULAR; INTRAVENOUS; SUBCUTANEOUS
Status: DISCONTINUED | OUTPATIENT
Start: 2020-12-17 | End: 2020-12-17 | Stop reason: HOSPADM

## 2020-12-17 RX ORDER — CEFAZOLIN SODIUM 1 G/3ML
INJECTION, POWDER, FOR SOLUTION INTRAMUSCULAR; INTRAVENOUS PRN
Status: DISCONTINUED | OUTPATIENT
Start: 2020-12-17 | End: 2020-12-17 | Stop reason: SURG

## 2020-12-17 RX ORDER — CELECOXIB 200 MG/1
200 CAPSULE ORAL ONCE
Status: DISCONTINUED | OUTPATIENT
Start: 2020-12-17 | End: 2020-12-17 | Stop reason: HOSPADM

## 2020-12-17 RX ORDER — ROCURONIUM BROMIDE 10 MG/ML
INJECTION, SOLUTION INTRAVENOUS PRN
Status: DISCONTINUED | OUTPATIENT
Start: 2020-12-17 | End: 2020-12-17 | Stop reason: SURG

## 2020-12-17 RX ORDER — DEXAMETHASONE SODIUM PHOSPHATE 4 MG/ML
INJECTION, SOLUTION INTRA-ARTICULAR; INTRALESIONAL; INTRAMUSCULAR; INTRAVENOUS; SOFT TISSUE PRN
Status: DISCONTINUED | OUTPATIENT
Start: 2020-12-17 | End: 2020-12-17 | Stop reason: HOSPADM

## 2020-12-17 RX ORDER — SODIUM CHLORIDE, SODIUM LACTATE, POTASSIUM CHLORIDE, CALCIUM CHLORIDE 600; 310; 30; 20 MG/100ML; MG/100ML; MG/100ML; MG/100ML
INJECTION, SOLUTION INTRAVENOUS CONTINUOUS
Status: DISCONTINUED | OUTPATIENT
Start: 2020-12-17 | End: 2020-12-17 | Stop reason: HOSPADM

## 2020-12-17 RX ADMIN — DEXAMETHASONE SODIUM PHOSPHATE 4 MG: 4 INJECTION, SOLUTION INTRAMUSCULAR; INTRAVENOUS at 07:30

## 2020-12-17 RX ADMIN — FENTANYL CITRATE 100 MCG: 50 INJECTION, SOLUTION INTRAMUSCULAR; INTRAVENOUS at 07:24

## 2020-12-17 RX ADMIN — HYDROMORPHONE HYDROCHLORIDE 1 MG: 2 INJECTION, SOLUTION INTRAMUSCULAR; INTRAVENOUS; SUBCUTANEOUS at 08:16

## 2020-12-17 RX ADMIN — ROCURONIUM BROMIDE 50 MG: 10 INJECTION, SOLUTION INTRAVENOUS at 07:24

## 2020-12-17 RX ADMIN — FENTANYL CITRATE 25 MCG: 50 INJECTION, SOLUTION INTRAMUSCULAR; INTRAVENOUS at 09:44

## 2020-12-17 RX ADMIN — OXYCODONE HYDROCHLORIDE 5 MG: 5 SOLUTION ORAL at 09:45

## 2020-12-17 RX ADMIN — GABAPENTIN 300 MG: 300 CAPSULE ORAL at 07:09

## 2020-12-17 RX ADMIN — ONDANSETRON 4 MG: 2 INJECTION INTRAMUSCULAR; INTRAVENOUS at 08:26

## 2020-12-17 RX ADMIN — KETAMINE HYDROCHLORIDE 75 MG: 50 INJECTION INTRAMUSCULAR; INTRAVENOUS at 07:24

## 2020-12-17 RX ADMIN — MIDAZOLAM HYDROCHLORIDE 2 MG: 1 INJECTION, SOLUTION INTRAMUSCULAR; INTRAVENOUS at 07:19

## 2020-12-17 RX ADMIN — SODIUM CHLORIDE, POTASSIUM CHLORIDE, SODIUM LACTATE AND CALCIUM CHLORIDE: 600; 310; 30; 20 INJECTION, SOLUTION INTRAVENOUS at 07:09

## 2020-12-17 RX ADMIN — SCOLOPAMINE TRANSDERMAL SYSTEM 1 PATCH: 1 PATCH, EXTENDED RELEASE TRANSDERMAL at 07:18

## 2020-12-17 RX ADMIN — PROPOFOL 200 MG: 10 INJECTION, EMULSION INTRAVENOUS at 07:24

## 2020-12-17 RX ADMIN — CEFAZOLIN 2 G: 1 INJECTION, POWDER, FOR SOLUTION INTRAVENOUS at 07:27

## 2020-12-17 RX ADMIN — LIDOCAINE HYDROCHLORIDE 100 MG: 20 INJECTION, SOLUTION EPIDURAL; INFILTRATION; INTRACAUDAL; PERINEURAL at 07:24

## 2020-12-17 RX ADMIN — POVIDONE IODINE 15 ML: 100 SOLUTION TOPICAL at 07:18

## 2020-12-17 RX ADMIN — FENTANYL CITRATE 25 MCG: 50 INJECTION, SOLUTION INTRAMUSCULAR; INTRAVENOUS at 09:55

## 2020-12-17 RX ADMIN — HYDROMORPHONE HYDROCHLORIDE 1 MG: 2 INJECTION, SOLUTION INTRAMUSCULAR; INTRAVENOUS; SUBCUTANEOUS at 08:26

## 2020-12-17 RX ADMIN — KETOROLAC TROMETHAMINE 30 MG: 30 INJECTION, SOLUTION INTRAMUSCULAR at 08:28

## 2020-12-17 RX ADMIN — ACETAMINOPHEN 1000 MG: 500 TABLET ORAL at 07:09

## 2020-12-17 ASSESSMENT — PAIN SCALES - GENERAL: PAIN_LEVEL: 5

## 2020-12-17 ASSESSMENT — PAIN DESCRIPTION - PAIN TYPE: TYPE: ACUTE PAIN

## 2020-12-17 ASSESSMENT — FIBROSIS 4 INDEX: FIB4 SCORE: 0.66

## 2020-12-17 NOTE — DISCHARGE INSTRUCTIONS
ACTIVITY: Rest and take it easy for the first 24 hours.  A responsible adult is recommended to remain with you during that time.  It is normal to feel sleepy.  We encourage you to not do anything that requires balance, judgment or coordination.    MILD FLU-LIKE SYMPTOMS ARE NORMAL. YOU MAY EXPERIENCE GENERALIZED MUSCLE ACHES, THROAT IRRITATION, HEADACHE AND/OR SOME NAUSEA.    FOR 24 HOURS DO NOT:  Drive, operate machinery or run household appliances.  Drink beer or alcoholic beverages.   Make important decisions or sign legal documents.    SPECIAL INSTRUCTIONS: Follow Dr Santana's hip discharge instructions    DIET: To avoid nausea, slowly advance diet as tolerated, avoiding spicy or greasy foods for the first day.  Add more substantial food to your diet according to your physician's instructions.  Babies can be fed formula or breast milk as soon as they are hungry.  INCREASE FLUIDS AND FIBER TO AVOID CONSTIPATION.    SURGICAL DRESSING/BATHING: Instruction sheet    FOLLOW-UP APPOINTMENT:  A follow-up appointment should be arranged with your doctor in  call to schedule.    You should CALL YOUR PHYSICIAN if you develop:  Fever greater than 101 degrees F.  Pain not relieved by medication, or persistent nausea or vomiting.  Excessive bleeding (blood soaking through dressing) or unexpected drainage from the wound.  Extreme redness or swelling around the incision site, drainage of pus or foul smelling drainage.  Inability to urinate or empty your bladder within 8 hours.  Problems with breathing or chest pain.    You should call 911 if you develop problems with breathing or chest pain.  If you are unable to contact your doctor or surgical center, you should go to the nearest emergency room or urgent care center.  Physician's telephone #: [phone]    If any questions arise, call your doctor.  If your doctor is not available, please feel free to call the Surgical Center at {Surgical Dept Numbers:[phone]}. The Contact  Center is open Monday through Friday 7AM to 5PM and may speak to a nurse at [phone], or toll free at [phone].     A registered nurse may call you a few days after your surgery to see how you are doing after your procedure.    MEDICATIONS: Resume taking daily medication.  Take prescribed pain medication with food.  If no medication is prescribed, you may take non-aspirin pain medication if needed.  PAIN MEDICATION CAN BE VERY CONSTIPATING.  Take a stool softener or laxative such as senokot, pericolace, or milk of magnesia if needed.    Prescription at home.  Last pain medication given at 0945.    If your physician has prescribed pain medication that includes Acetaminophen (Tylenol), do not take additional Acetaminophen (Tylenol) while taking the prescribed medication.    Depression / Suicide Risk    As you are discharged from this [facility] facility, it is important to learn how to keep safe from harming yourself.    Recognize the warning signs:  · Abrupt changes in personality, positive or negative- including increase in energy   · Giving away possessions  · Change in eating patterns- significant weight changes-  positive or negative  · Change in sleeping patterns- unable to sleep or sleeping all the time   · Unwillingness or inability to communicate  · Depression  · Unusual sadness, discouragement and loneliness  · Talk of wanting to die  · Neglect of personal appearance   · Rebelliousness- reckless behavior  · Withdrawal from people/activities they love  · Confusion- inability to concentrate     If you or a loved one observes any of these behaviors or has concerns about self-harm, here's what you can do:  · Talk about it- your feelings and reasons for harming yourself  · Remove any means that you might use to hurt yourself (examples: pills, rope, extension cords, firearm)  · Get professional help from the community (Mental Health, Substance Abuse, psychological counseling)  · Do not be alone:Call  your Safe Contact- someone whom you trust who will be there for you.  · Call your local CRISIS HOTLINE 402-2540 or 480-462-9639  · Call your local Children's Mobile Crisis Response Team [place] (379) 815-7340 or www.[employer]  · Call the toll free National Suicide Prevention Hotlines   · National Suicide Prevention Lifeline 842-457-OKKI (8892)  · National [employer] Line Network 800-SUICIDE (010-7384)

## 2020-12-17 NOTE — OR NURSING
0852 received from or  resp spont left hip dressing c/d/i    Left hip dressing c/d/i  Dp2+  Foot warm  Brace in place  Ice pack applied 0930 medicated for pain prn 1000 fentanyl causes o2 sat <90  Explained to pt  Pain [id number] sleeping  Easily aroused 1`045 meets discharge criteria

## 2020-12-17 NOTE — ANESTHESIA TIME REPORT
Anesthesia Start and Stop Event Times     Date Time Event    12/17/2020 0654 Ready for Procedure     0719 Anesthesia Start     0855 Anesthesia Stop        Responsible Staff  12/17/20    Name Role Begin End    Deborah Wagner M.D. Anesth 0719 0855        Preop Diagnosis (Free Text):  Pre-op Diagnosis     OTHER ARTICULAR CARTILAGE DISORDERS LEFT HIP, HIP PAIN LEFT        Preop Diagnosis (Codes):    Post op Diagnosis  Hip pain      Premium Reason  Non-Premium    Comments:

## 2020-12-17 NOTE — ANESTHESIA PREPROCEDURE EVALUATION
44yo female with chronic pain on suboxone (continue for surgery), well controlled HTN, PTSD/anxiety  Allergies: septra, also foods  Meds:  Alprazolam, asa, suboxone, fe, cymbalta, losartan, HCTZ, metoprolol, topramax    Relevant Problems   CARDIAC   (+) Deep vein thrombosis (HCC)       Physical Exam    Airway   Mallampati: II  TM distance: >3 FB  Neck ROM: full       Cardiovascular - normal exam  Rhythm: regular  Rate: normal  (-) murmur     Dental - normal exam           Pulmonary - normal exam  Breath sounds clear to auscultation     Abdominal    Neurological - normal exam                 Anesthesia Plan    ASA 2       Plan - general       Airway plan will be ETT        Induction: intravenous    Postoperative Plan: Postoperative administration of opioids is intended.    Pertinent diagnostic labs and testing reviewed    Informed Consent:    Anesthetic plan and risks discussed with patient.    Use of blood products discussed with: patient whom consented to blood products.

## 2020-12-17 NOTE — OR NURSING
0545: Pt and  are in the [place], pt is refusing to wear a mask or a face shield. RN spoke to pt and  about the need to wear either, but pt still refusing. The NAM was called.  0610: NAM speaking to pt and , pt still refusing mask or face shield.  0620: Dr Shepard contacted and informed about pt refusing to wear mask. He stated that pt either comply or surgery would be cancelled.  0630: Pt and  informed of Dr Shepard's decision. Pt agrees to wear a face shield.  0635: Brought patient back to pre-op and assumed care.  0717: Patient allergies and NPO status verified, home medication reconciliation completed and belongings secured. Patient verbalizes understanding of pain scale, expected course of stay and plan of care. Surgical site verified with patient. IV access established. Sequentials placed on legs.  0730: LAM Muniz notified of pt's refusal.

## 2020-12-17 NOTE — OP REPORT
DATE OF SERVICE:  12/17/2020     SURGEON:  Emre Shepard MD     ASSISTANT:  QUINN Hinton     ANESTHESIOLOGIST:  Deborah Wagner MD     ANESTHESIA:  General.     PREOPERATIVE DIAGNOSES:  1.  Left hip femoroacetabular impingement-combination Cam and pincer type.  2.  Left hip anterior superior labral tear.     POSTOPERATIVE DIAGNOSES  1.  Left hip femoroacetabular impingement-combination Cam and pincer type.  2.  Left hip anterior superior labral tear.  3.  Left hip acetabular cartilage injury/osteoarthritis.  4.  Left hip synovitis.     PROCEDURES PERFORMED:  1.  Left hip arthroscopy.  2.  Left hip selective labral debridement.  3.  Left hip acetabular rim trimming.  4.  Left hip acetabular chondroplasty.  5.  Left hip synovectomy.  6.  Left hip femoral neck osteochondroplasty.     HISTORY OF PRESENT ILLNESS:  This is a very pleasant 45-year-old female with   progressive worsening left hip pain.  She was diagnosed with a labral tear in   the presence of femoroacetabular impingement.  She failed conservative   management.  We discussed surgical intervention.  The patient has been n.p.o.   since midnight.  She is medically cleared for surgery by the anesthesia team.     INFORMED CONSENT:  The patient was informed of the risks, benefits and   alternatives and planned operation.  The risks include but not limited to   bleeding, infection, neurovascular damage, heterotopic ossification, avascular   necrosis, worsening osteoarthritis, femoral neck fracture, pain, stiffness,   instability, DVT, PE, MI, stroke and death.  Advanced directives were   reviewed.  After answering all questions of the patient's planned operations,   informed consent form signed.     IMPLANTS:  None.     DESCRIPTION OF PROCEDURE:  The patient was identified in the preoperative   holding area.  The correct procedural side and site was identified and marked.    The patient was brought to the operating room where she underwent general    anesthesia by the anesthesia team.  She was then carefully transferred to the   traction table.  All bony prominences well padded.  Both feet were then placed   in a well-padded traction boots and a perineal post was then placed up   against the left medial thigh.  The right lower extremity was abducted to 40   degrees and the left lower extremity abducted to 10 degrees.  The patient was   allowed to rise gross axial traction on the right lower extremity followed by   gross axial traction on the left lower extremity.  Fine traction was then   applied to the left lower extremity to obtain about 1-1.5 cm of left hip joint   distraction under fluoroscopy guidance.  Distraction was then released and   repeat fluoroscopy view demonstrated the femoral head was concentrically   reduced within the acetabulum.  Left lower extremity was then prepped and   draped in normal standard sterile fashion.  A procedural pause was then   performed with operating room team.  The patient was given IV antibiotics   prior to incision.     The left lower extremity traction was again implemented under fluoroscopy   guidance.  I then created anterolateral portal in the standard fashion under   fluoroscopy guidance and I introduced the 70-degree arthroscope.  I then   created a modified mid anterior portal under direct visualization with the   camera transition in the medial portal.  There was no evidence of iatrogenic   into the labrum or femoral head cartilage.  I then placed a Hemovac in the   anterolateral portal and a full diagnostic arthroscopy was performed.     There was diffuse synovitis throughout the left hip joint.  There was   extensive tearing of the anterior superior labrum from approximately the   12:00-3:30 position anteriorly.  The more posterior and superior labral tissue   appeared to be intact and stable.  The torn labrum had a degenerative   appearance with a large horizontal cleavage tear as well as a separation from    the adjacent acetabular rim.  There was also evidence of a grade III cartilage   damage over the anterior superior acetabulum adjacent to the torn labrum.  It   involved the peripheral 3-4 mm of the acetabulum.   There was also wave sign   at the more superior acetabulum consistent with some underlying impingement.    The remainder of the acetabular cartilage appeared to be intact.  There was   some synovitis around the ligamentum teres, but no evidence of a tear.  No   significant cartilage pathology of the femoral head.  Examination of the   peripheral compartment demonstrated a small anterolateral CAM deformity from   12:30-5:30 position anteriorly.     I then performed an anterior portal capsulotomy.  A synovectomy was then   performed throughout the hip joint with the use of the oscillating suction   shaver device and the logic and the electrocautery wand. I debrided some of   the synovitis around the ligamentum teres.  I then debrided the capsule   adjacent to the torn anterior superior labrum. Given the labral tear location   and patterns as well as the overall appearance of the labral tissue, I did not   think it was amenable to repair.  As a result, allowed to proceed with a   selective labral debridement.  I used a combination of the arthroscopic biters   and the oscillating suction shaver device.  I then removed the torn unstable   tissue for approximately the 12 o'clock to 2:30 position anteriorly.  I then   performed a gentle chondroplasty of the adjacent acetabular cartilage damage.    Once again, noted some grade III changes over the anterior superior   peripheral acetabulum and some grade II changes/wave sign more superiorly.  No   evidence of full thickness grade IV cartilage loss.  I also noted an area of   grade IV cartilage loss over the posterior superior acetabulum for   approximately the 11 o'clock position.  There was no labral tear in this  area.        ______________________________  MD CYDNEY Szymanski/PATSY/DAAN    DD:  12/17/2020 09:44  DT:  12/17/2020 11:51    Job#:  [id number]    CC:Altagracia Hoffman PA-C

## 2020-12-17 NOTE — OP REPORT
DATE OF SERVICE:  12/17/2020     SURGEON:  Emre Shepard MD     ASSISTANT:  QUINN Hinton     ANESTHESIOLOGIST:  Deborah Wagner MD     ANESTHESIA:  General anesthesia.     PREOPERATIVE DIAGNOSES:  1.  Left hip femoral acetabular impingement-combination of Cam and pincer   type.  2.  Left hip anterior superior labral tear.  3.  Left hip acetabular chondromalacia.     POSTOPERATIVE DIAGNOSES:  1.  Left hip femoral acetabular impingement-combination of Cam and pincer   type.  2.  Left hip anterior superior labral tear.  3.  Left hip acetabular chondromalacia.  4.  Left hip synovitis.     PROCEDURES PERFORMED:  1.  Left hip arthroscopy.  2.  Left hip selective labral debridement.  3.  Left hip acetabular rim trimming.  4.  Left hip acetabular chondroplasty  5.  Left hip synovectomy.  6.  Left hip femoral neck osteochondroplasty.     HISTORY OF PRESENT ILLNESS:  This is a very pleasant 45-year-old female with   progressively worsening left hip pain.  She was diagnosed with labral tears in   the presence of _____.  She failed conservative management.  We discussed   surgical intervention.  The patient has been n.p.o. since midnight.  She is   medically cleared for surgery by the anesthesia team.     INFORMED CONSENT:  The patient informed of the risks, benefits and   alternatives, planned operation.  The risks include but not limited to   bleeding, infection, neurovascular damage, heterotopic ossification, avascular   necrosis, femoral neck fracture, worsening osteoarthritis/cartilage damage,   pain, stiffness, instability, DVT, PE, MI, stroke and death.  Advanced   directives were reviewed.  After answering all questions of the patient's   planned operation, informed consent was signed.     IMPLANTS:  None.     DESCRIPTION OF PROCEDURE:  The patient was identified in preoperative holding   area and marked.  The patient was brought to the operating room where she   underwent general anesthesia by the  anesthesia team.  She was then carefully   transferred to the Remigio table.  All bony prominences were well padded.    Both feet were then placed in a well-padded traction boot and a perineal post,   placed against the left medial thigh.  The right lower extremity was abducted   to 40 degrees and the left lower extremity to 10 degrees.  The patient was   then lateralized with gross axial traction on the right lower extremity   followed by gross axial traction on the left lower extremity.  Fine traction   was then applied to the left lower extremity to obtain about 1-1.5 cm of left   hip joint distraction.  Under fluoroscopy guidance, the traction was then   released and repeat fluoroscopy view demonstrated a femoral head was   concentrically reduced within the acetabulum.  The left lower extremity was   then prepped and draped in normal standard sterile fashion.  A procedural   pause was then performed in the operating room team using IV antibiotics prior   to incision.     The left lower extremity traction was again implemented under fluoroscopy   guidance.  I then created an anterolateral portal in the standard fashion   under fluoroscopy guidance.  The 70-degree arthroscope was then inserted.  I   then created a modified mid anterior portal under direct visualization with   the camera transition in the mid anterior portal.  There was no evidence of   iatrogenic injury to the labrum or femoral head cartilage.  Camera was then   placed back in the anterolateral portal and a full diagnostic arthroscopy was   performed.     There was some diffuse synovitis throughout the left hip joint.  There was   also some synovitis around the ligamentum teres.  No obvious tearing here.    There was extensive degenerative type tearing of the anterior superior labrum   from approximately the 12 o'clock to 2:30 position anteriorly.  There was   extensive horizontal cleavage tear as well as separation from the adjacent   acetabular  rim.  The more superior and posterior labrum appeared to be intact   and there was an area of grade III cartilage loss over the peripheral anterior   superior acetabulum as well as a small area of grade IV cartilage loss over   the posterior superior acetabulum.  There was also grade II changes consistent   with a wave sign more superiorly.  The remainder of the acetabular cartilage   appeared to be intact.  There was no significant cartilage pathology of the   femoral head.  Examination of the peripheral compartment demonstrated a small   anterolateral Cam deformity from approximately 12:30 to 5:00 o'clock position   anteriorly.  There was _____ femoral head, neck offset.     An anterior portal capsulotomy was then performed.  I then performed a   synovectomy throughout the left hip joint with the oscillating suction shaver   device and electrocautery wand.  I then carefully debrided the capsule   adjacent the torn anterior superior labrum given the labral tear location and   pattern as well as the degenerative appearance of the tissue, I did not think   it was amenable to repair.  As a result, I would like to proceed with a   selective labral debridement using a combination of the arthroscopic biters   and the oscillating suction shaver device, I debrided and removed the torn and   unstable labral tissue from approximately 12 o'clock to 2:30 position   anteriorly.  Probing the remaining labrum demonstrated was nice and stable.    At this point in time, I noted a relatively prominent pincer lesion from once   again from the 12 o'clock to 2:30 position anteriorly.  A small acetabular rim   trimming was performed with a 5.5 mm bur removing about 4 mm of prominent   bone. No weightbearing portion of the acetabulum was removed.  I then   performed a gentle chondroplasty of the anterior superior and posterior   superior cartilage lesions.  Once again noted grade III changes anteriorly and   a small area of grade IV  cartilage loss posteriorly.  The oscillating suction   shaver device was placed back in the joint to remove soft tissue and debris.    Traction was then released and a femoral head was noted to be concentrically   reduced within the acetabulum.  Total traction time was 21 minutes.     The hip was then flexed to 40 degrees. I then debrided some of the overlying   capsules until I was able to visualize the medial retinacular fold as well as   the lateral retinacular vessels.  I also then created a DALA portal and placed   two traction sutures in the distal part of the capsule to improve   visualization.  I then marked off the area of the Cam deformity under direct   visualization as well as under fluoroscopy guidance. Using a 5.5 mm bur, the   proximal femoral head and neck osteochondroplasty was performed from 12:30 to   5 o'clock position anteriorly.  Hip was then brought through a full range of   motion.  There was no residual impingement.  The oscillating suction shaver   device was placed back in the joint to remove soft tissue and debris.    Capsular repair of the capsulotomy was then performed with three #2 Cobraid   sutures.  All instruments were then removed.     Meticulous hemostasis obtained.  The portal sites were closed in simple 3-0   Prolene with followed by Steri-Strip and Xeroform.  The hip was injected with   30 mL of 0.5% bupivacaine.  A sterile compressive dressing was applied   followed by a well-padded hip abduction brace.     Needle and sponge counts were correct at the end of the procedure as reported   to the surgeon by the circulating nurse.  The patient tolerated the procedure   well with no complications.  The patient was then transferred off the   operating table on to the hospital bed.  She was extubated by the anesthesia   team.     ESTIMATED BLOOD LOSS:  5 mL     COMPLICATIONS:  None.     TRACTION TIME:  21 minutes.     SPECIMENS:  None.     WOUND TYPE:  Type 1 clean.     POSTOPERATIVE  PLAN:  The patient will be transferred back to the postoperative   unit.  I expect she will be discharged from the hospital later this morning   once mobilizing safely and tolerating oral medications.  She will remain   partial flat foot weightbearing on the left lower extremity with the use of   crutches and the brace in place.  The patient does have a history of a DVT, so   she will be on a prophylactic dose of Xarelto for the next 14 days.        ______________________________  MD CYDNEY Szymanski/AVERY    DD:  12/17/2020 09:55  DT:  12/17/2020 12:33    Job#:  [id number]

## 2020-12-17 NOTE — ANESTHESIA POSTPROCEDURE EVALUATION
Patient: Santa Carter    Procedure Summary     Date: 12/17/20 Room / Location: [place] [id number] / SURGERY [facility]    Anesthesia Start: 0719 Anesthesia Stop: 0855    Procedures:       ARTHROSCOPY, HIP - LABRAL DEBRIDEMENT VERSUS NING REPAIR (Left Hip)      OSTEOPLASTY, FEMUR, NECK (Left Hip)      OSTEOPLASTY, ACETABULUM - FOR RIM TRIMMING AND MAR (Left Hip) Diagnosis: (OTHER ARTICULAR CARTILAGE DISORDERS LEFT HIP, HIP PAIN LEFT)    Surgeons: Emre Shepard M.D. Responsible Provider: Deborah Wagner M.D.    Anesthesia Type: general ASA Status: 2          Final Anesthesia Type: general  Last vitals  BP   Blood Pressure: 127/80    Temp   36.9 °C (98.4 °F)    Pulse   Pulse: 90   Resp   16    SpO2   94 %      Anesthesia Post Evaluation    Patient location during evaluation: PACU  Patient participation: complete - patient participated  Level of consciousness: awake and alert  Pain score: 5    Airway patency: patent  Anesthetic complications: no  Cardiovascular status: hemodynamically stable  Respiratory status: acceptable  Hydration status: euvolemic    PONV: none           Nurse Pain Score: 5 (NPRS)

## 2020-12-17 NOTE — OR NURSING
717: Patient allergies and NPO status verified, expected course of stay and plan of care. Surgical site verified with patient. When asked about having contacts she said yes and refused to take them out after educating on the potential for harm.

## 2022-03-14 PROBLEM — M24.151 ARTICULAR CARTILAGE DISORDER OF HIP, RIGHT: Status: ACTIVE | Noted: 2022-03-14

## 2022-03-15 ENCOUNTER — HOSPITAL ENCOUNTER (OUTPATIENT)
Dept: RADIOLOGY | Facility: MEDICAL CENTER | Age: 47
End: 2022-03-15
Attending: GENERAL PRACTICE
Payer: COMMERCIAL

## 2022-03-15 DIAGNOSIS — Z12.31 VISIT FOR SCREENING MAMMOGRAM: ICD-10-CM

## 2022-03-15 PROCEDURE — 77063 BREAST TOMOSYNTHESIS BI: CPT

## 2022-03-28 ENCOUNTER — HOSPITAL ENCOUNTER (OUTPATIENT)
Dept: LAB | Facility: MEDICAL CENTER | Age: 47
End: 2022-03-28
Attending: PHYSICIAN ASSISTANT
Payer: COMMERCIAL

## 2022-03-28 ENCOUNTER — HOSPITAL ENCOUNTER (OUTPATIENT)
Dept: LAB | Facility: MEDICAL CENTER | Age: 47
End: 2022-03-28
Attending: GENERAL PRACTICE
Payer: COMMERCIAL

## 2022-03-28 LAB
25(OH)D3 SERPL-MCNC: 44 NG/ML (ref 30–100)
ALBUMIN SERPL BCP-MCNC: 4.4 G/DL (ref 3.2–4.9)
ALBUMIN/GLOB SERPL: 2 G/DL
ALP SERPL-CCNC: 115 U/L (ref 30–99)
ALT SERPL-CCNC: 25 U/L (ref 2–50)
ANION GAP SERPL CALC-SCNC: 12 MMOL/L (ref 7–16)
AST SERPL-CCNC: 24 U/L (ref 12–45)
BASOPHILS # BLD AUTO: 0.5 % (ref 0–1.8)
BASOPHILS # BLD: 0.03 K/UL (ref 0–0.12)
BILIRUB SERPL-MCNC: <0.2 MG/DL (ref 0.1–1.5)
BUN SERPL-MCNC: 19 MG/DL (ref 8–22)
CALCIUM SERPL-MCNC: 9.5 MG/DL (ref 8.5–10.5)
CHLORIDE SERPL-SCNC: 101 MMOL/L (ref 96–112)
CHOLEST SERPL-MCNC: 240 MG/DL (ref 100–199)
CO2 SERPL-SCNC: 26 MMOL/L (ref 20–33)
CREAT SERPL-MCNC: 0.74 MG/DL (ref 0.5–1.4)
CRP SERPL HS-MCNC: 0.63 MG/DL (ref 0–0.75)
EOSINOPHIL # BLD AUTO: 0.11 K/UL (ref 0–0.51)
EOSINOPHIL NFR BLD: 1.8 % (ref 0–6.9)
ERYTHROCYTE [DISTWIDTH] IN BLOOD BY AUTOMATED COUNT: 48.2 FL (ref 35.9–50)
ERYTHROCYTE [SEDIMENTATION RATE] IN BLOOD BY WESTERGREN METHOD: 14 MM/HOUR (ref 0–25)
FASTING STATUS PATIENT QL REPORTED: NORMAL
GFR SERPLBLD CREATININE-BSD FMLA CKD-EPI: 100 ML/MIN/1.73 M 2
GLOBULIN SER CALC-MCNC: 2.2 G/DL (ref 1.9–3.5)
GLUCOSE SERPL-MCNC: 85 MG/DL (ref 65–99)
HCT VFR BLD AUTO: 43.7 % (ref 37–47)
HDLC SERPL-MCNC: 55 MG/DL
HGB BLD-MCNC: 13.9 G/DL (ref 12–16)
IMM GRANULOCYTES # BLD AUTO: 0.02 K/UL (ref 0–0.11)
IMM GRANULOCYTES NFR BLD AUTO: 0.3 % (ref 0–0.9)
LDLC SERPL CALC-MCNC: 167 MG/DL
LYMPHOCYTES # BLD AUTO: 1.28 K/UL (ref 1–4.8)
LYMPHOCYTES NFR BLD: 21.1 % (ref 22–41)
MCH RBC QN AUTO: 28.2 PG (ref 27–33)
MCHC RBC AUTO-ENTMCNC: 31.8 G/DL (ref 33.6–35)
MCV RBC AUTO: 88.6 FL (ref 81.4–97.8)
MONOCYTES # BLD AUTO: 0.56 K/UL (ref 0–0.85)
MONOCYTES NFR BLD AUTO: 9.2 % (ref 0–13.4)
NEUTROPHILS # BLD AUTO: 4.08 K/UL (ref 2–7.15)
NEUTROPHILS NFR BLD: 67.1 % (ref 44–72)
NRBC # BLD AUTO: 0 K/UL
NRBC BLD-RTO: 0 /100 WBC
PLATELET # BLD AUTO: 357 K/UL (ref 164–446)
PMV BLD AUTO: 9.4 FL (ref 9–12.9)
POTASSIUM SERPL-SCNC: 4 MMOL/L (ref 3.6–5.5)
PROT SERPL-MCNC: 6.6 G/DL (ref 6–8.2)
RBC # BLD AUTO: 4.93 M/UL (ref 4.2–5.4)
SODIUM SERPL-SCNC: 139 MMOL/L (ref 135–145)
TRIGL SERPL-MCNC: 88 MG/DL (ref 0–149)
TSH SERPL DL<=0.005 MIU/L-ACNC: 3.57 UIU/ML (ref 0.38–5.33)
WBC # BLD AUTO: 6.1 K/UL (ref 4.8–10.8)

## 2022-03-28 PROCEDURE — 85652 RBC SED RATE AUTOMATED: CPT

## 2022-03-28 PROCEDURE — 84443 ASSAY THYROID STIM HORMONE: CPT

## 2022-03-28 PROCEDURE — 80053 COMPREHEN METABOLIC PANEL: CPT

## 2022-03-28 PROCEDURE — 86140 C-REACTIVE PROTEIN: CPT

## 2022-03-28 PROCEDURE — 85025 COMPLETE CBC W/AUTO DIFF WBC: CPT

## 2022-03-28 PROCEDURE — 36415 COLL VENOUS BLD VENIPUNCTURE: CPT

## 2022-03-28 PROCEDURE — 82306 VITAMIN D 25 HYDROXY: CPT

## 2022-03-28 PROCEDURE — 80061 LIPID PANEL: CPT

## 2022-03-28 PROCEDURE — 86038 ANTINUCLEAR ANTIBODIES: CPT

## 2022-03-30 LAB — NUCLEAR IGG SER QL IA: NORMAL

## 2022-04-21 PROBLEM — I10 HTN (HYPERTENSION): Status: ACTIVE | Noted: 2022-04-21

## 2022-04-21 PROBLEM — E66.01 OBESITY, CLASS III, BMI 40-49.9 (MORBID OBESITY) (HCC): Status: ACTIVE | Noted: 2022-04-21

## 2022-04-21 PROBLEM — E66.813 OBESITY, CLASS III, BMI 40-49.9 (MORBID OBESITY): Status: ACTIVE | Noted: 2022-04-21

## 2022-04-21 PROBLEM — F39 MOOD DISORDER (HCC): Status: ACTIVE | Noted: 2022-04-21

## 2022-04-21 PROBLEM — M79.7 FIBROMYALGIA: Status: ACTIVE | Noted: 2022-04-21

## 2022-08-15 PROBLEM — M16.9 OSTEOARTHRITIS OF HIP: Status: ACTIVE | Noted: 2022-08-15

## 2022-10-02 NOTE — ANESTHESIA TIME REPORT
Anesthesia Start and Stop Event Times     Date Time Event    10/27/2020 0645 Anesthesia Start     0649 Ready for Procedure     0745 Anesthesia Stop        Responsible Staff  10/27/20    Name Role Begin End    Andrew Peoples M.D. Anesth 0645 0745        Preop Diagnosis (Free Text):  Pre-op Diagnosis     BURLESON'S NEUROMA RIGHT, FOOT PAIN RIGHT        Preop Diagnosis (Codes):    Post op Diagnosis  Neuroma      Premium Reason  A. 3PM - 7AM    Comments: pre 0700 block x 2                                                                       
sinus bradycardia

## 2022-10-21 ENCOUNTER — HOSPITAL ENCOUNTER (OUTPATIENT)
Facility: MEDICAL CENTER | Age: 47
End: 2022-10-21
Attending: ANESTHESIOLOGY
Payer: COMMERCIAL

## 2022-10-21 PROCEDURE — U0003 INFECTIOUS AGENT DETECTION BY NUCLEIC ACID (DNA OR RNA); SEVERE ACUTE RESPIRATORY SYNDROME CORONAVIRUS 2 (SARS-COV-2) (CORONAVIRUS DISEASE [COVID-19]), AMPLIFIED PROBE TECHNIQUE, MAKING USE OF HIGH THROUGHPUT TECHNOLOGIES AS DESCRIBED BY CMS-2020-01-R: HCPCS

## 2022-10-21 PROCEDURE — U0005 INFEC AGEN DETEC AMPLI PROBE: HCPCS

## 2022-10-22 LAB
SARS-COV-2 RNA RESP QL NAA+PROBE: NOTDETECTED
SPECIMEN SOURCE: NORMAL

## 2022-10-24 ENCOUNTER — HOSPITAL ENCOUNTER (OUTPATIENT)
Dept: RADIOLOGY | Facility: MEDICAL CENTER | Age: 47
End: 2022-10-24
Attending: STUDENT IN AN ORGANIZED HEALTH CARE EDUCATION/TRAINING PROGRAM
Payer: COMMERCIAL

## 2022-10-24 DIAGNOSIS — M79.601 PAIN OF RIGHT UPPER EXTREMITY: ICD-10-CM

## 2022-10-24 PROCEDURE — 73206 CT ANGIO UPR EXTRM W/O&W/DYE: CPT | Mod: RT

## 2022-10-24 PROCEDURE — 700117 HCHG RX CONTRAST REV CODE 255

## 2022-10-24 RX ADMIN — IOHEXOL 75 ML: 350 INJECTION, SOLUTION INTRAVENOUS at 16:31

## 2022-10-24 RX ADMIN — IOHEXOL 75 ML: 350 INJECTION, SOLUTION INTRAVENOUS at 16:28

## 2022-10-27 PROBLEM — M16.9 OSTEOARTHROSIS, HIP: Status: ACTIVE | Noted: 2022-10-27

## 2022-12-07 NOTE — ANESTHESIA POSTPROCEDURE EVALUATION
Patient: Santa Carter    Procedure Summary     Date: 10/27/20 Room / Location: [place] [address] / SURGERY [facility]    Anesthesia Start: 0645 Anesthesia Stop: 0745    Procedures:       EXCISION, NEUROMA (Right Foot)      EXCISION, SCAR - FOOT (Right Foot)      REMOVAL, HARDWARE (Right Foot) Diagnosis: (BURLESON'S NEUROMA RIGHT, FOOT PAIN RIGHT)    Surgeon: Amos Nolasco M.D. Responsible Provider: Andrew Peoples M.D.    Anesthesia Type: general, peripheral nerve block ASA Status: 2          Final Anesthesia Type: general, peripheral nerve block  Last vitals  BP   Blood Pressure: 149/90    Temp   36.5 °C (97.7 °F)    Pulse   Pulse: 99   Resp   12    SpO2   94 %      Anesthesia Post Evaluation    Patient location during evaluation: PACU  Patient participation: complete - patient participated  Level of consciousness: awake and alert  Pain score: 3    Airway patency: patent  Anesthetic complications: no  Cardiovascular status: hemodynamically stable  Respiratory status: acceptable  Hydration status: euvolemic    PONV: none           Nurse Pain Score: 6 (NPRS)         Which Kenalog Vial Was Used?: Kenalog 10 mg/ml (5 ml vial)

## 2023-08-09 NOTE — ANESTHESIA QCDR
2019 Qualified Clinical Data Registry (for Quality Improvement)     Postoperative nausea/vomiting risk protocol (Adult = 18 yrs and Pediatric 3-17 yrs)- (430 and 463)  General inhalation anesthetic (NOT TIVA) with PONV risk factors: Yes  Provision of anti-emetic therapy with at least 2 different classes of agents: Yes   Patient DID NOT receive anti-emetic therapy and reason is documented in Medical Record:  N/A    Multimodal Pain Management- (477)  Non-emergent surgery AND patient age >= 18: Yes  Use of Multimodal Pain Management, two or more drugs and/or interventions, NOT including systemic opioids: Yes  Exception: Documented allergy to multiple classes of analgesics: N/A    Smoking Abstinence (404)  Patient is current smoker (cigarette, pipe, e-cig, marijuanna): No  Elective Surgery:   Abstinence instructions provided prior to day of surgery:   Patient abstained from smoking on day of surgery:     Pre-Op Beta-Blocker in Isolated CABG (44)  Isolated CABG AND patient age >= 18: No  Beta-blocker admin within 24 hours of surgical incision:   Exception:of medical reason(s) for not administering beta blocker within 24 hours prior to surgical incision (e.g., not  indicated,other medical reason):     PACU assessment of acute postoperative pain prior to Anesthesia Care End- Applies to Patients Age = 18- (ABG7)  Initial PACU pain score is which of the following: < 7/10  Patient unable to report pain score: N/A    Post-anesthetic transfer of care checklist/protocol to PACU/ICU- (426 and 427)  Upon conclusion of case, patient transferred to which of the following locations: PACU/Non-ICU  Use of transfer checklist/protocol: Yes  Exclusion: Service Performed in Patient Hospital Room (and thus did not require transfer): N/A  Unplanned admission to ICU related to anesthesia service up through end of PACU care- (MD51)  Unplanned admission to ICU (not initially anticipated at anesthesia start time): No          What Was Performed First?: Curettage

## 2023-09-18 ENCOUNTER — APPOINTMENT (OUTPATIENT)
Dept: PHYSICAL THERAPY | Facility: REHABILITATION | Age: 48
End: 2023-09-18
Attending: STUDENT IN AN ORGANIZED HEALTH CARE EDUCATION/TRAINING PROGRAM
Payer: COMMERCIAL

## 2024-10-04 ENCOUNTER — HOSPITAL ENCOUNTER (OUTPATIENT)
Dept: RADIOLOGY | Facility: MEDICAL CENTER | Age: 49
End: 2024-10-04
Attending: GENERAL PRACTICE
Payer: COMMERCIAL

## 2024-10-04 DIAGNOSIS — Z12.31 ENCOUNTER FOR SCREENING MAMMOGRAM FOR BREAST CANCER: ICD-10-CM

## 2024-10-04 PROCEDURE — 77067 SCR MAMMO BI INCL CAD: CPT

## 2025-04-30 ENCOUNTER — HOSPITAL ENCOUNTER (OUTPATIENT)
Dept: RADIOLOGY | Facility: MEDICAL CENTER | Age: 50
End: 2025-04-30
Attending: PAIN MEDICINE
Payer: COMMERCIAL

## 2025-04-30 DIAGNOSIS — R31.9 LOIN PAIN-HEMATURIA SYNDROME: ICD-10-CM

## 2025-04-30 DIAGNOSIS — M54.50 LOIN PAIN-HEMATURIA SYNDROME: ICD-10-CM

## 2025-04-30 PROCEDURE — 72148 MRI LUMBAR SPINE W/O DYE: CPT

## 2025-04-30 PROCEDURE — 72100 X-RAY EXAM L-S SPINE 2/3 VWS: CPT

## (undated) DEVICE — HUMID-VENT HEAT AND MOISTURE EXCHANGE- (50/BX)

## (undated) DEVICE — PROTECTOR ULNA NERVE - (36PR/CA)

## (undated) DEVICE — SUTURE 3-0 ETHILON PS-1 (36PK/BX)

## (undated) DEVICE — SUTURE GENERAL

## (undated) DEVICE — CLOSURE SKIN STRIP 1/2 X 4 IN - (STERI STRIP) (50/BX 4BX/CA)

## (undated) DEVICE — BAG SPONGE COUNT 10.25 X 32 - BLUE (250/CA)

## (undated) DEVICE — GLOVE BIOGEL PI INDICATOR SZ 6.5 SURGICAL PF LF - (50/BX 4BX/CA)

## (undated) DEVICE — SUTURE 3-0 ETHILON FS-1 - (36/BX) 30 INCH

## (undated) DEVICE — NEEDLE W/FACET S TIP ECHOGENIC W/STIMULATION CABLE SONOPLEX II 21G X 4 (10EA/BX)"

## (undated) DEVICE — MASK ANESTHESIA ADULT  - (100/CA)

## (undated) DEVICE — WATER IRRIGATION STERILE 1000ML (12EA/CA)

## (undated) DEVICE — SODIUM CHL IRRIGATION 0.9% 1000ML (12EA/CA)

## (undated) DEVICE — CONTAINER SPECIMEN BAG OR - STERILE 4 OZ W/LID (100EA/CA)

## (undated) DEVICE — LACTATED RINGERS INJ 1000 ML - (14EA/CA 60CA/PF)

## (undated) DEVICE — PAD LAP STERILE 18 X 18 - (5/PK 40PK/CA)

## (undated) DEVICE — GOWN WARMING STANDARD FLEX - (30/CA)

## (undated) DEVICE — HEAD HOLDER JUNIOR/ADULT

## (undated) DEVICE — GLOVE BIOGEL SZ 8 SURGICAL PF LTX - (50PR/BX 4BX/CA)

## (undated) DEVICE — CHLORAPREP 26 ML APPLICATOR - ORANGE TINT(25/CA)

## (undated) DEVICE — SET LEADWIRE 5 LEAD BEDSIDE DISPOSABLE ECG (1SET OF 5/EA)

## (undated) DEVICE — TOURNIQUET CUFF 34 X 4 ONE PORT DISP - STERILE (10/BX)

## (undated) DEVICE — SLEEVE, VASO, THIGH, MED

## (undated) DEVICE — GLOVE BIOGEL INDICATOR SZ 8.5 SURGICAL PF LTX - (50/BX 4BX/CA)

## (undated) DEVICE — SUTURE 3-0 VICRYL PLUS SH - 8X 18 INCH (12/BX)

## (undated) DEVICE — PACK SHOULDER ARTHROSCOPY SM - (2EA/CA)

## (undated) DEVICE — GLOVE, LITE (PAIR)

## (undated) DEVICE — TOWELS CLOTH SURGICAL - (4/PK 20PK/CA)

## (undated) DEVICE — SENSOR SPO2 NEO LNCS ADHESIVE (20/BX) SEE USER NOTES

## (undated) DEVICE — GLOVE SZ 6.5 BIOGEL PI MICRO - PF LF (50PR/BX)

## (undated) DEVICE — PACK HIP ARTHROSCOPIC DISPOSABLE

## (undated) DEVICE — BOVIE NEEDLE TIP 3CM COLORADO

## (undated) DEVICE — SET EXTENSION WITH 2 PORTS (48EA/CA) ***PART #2C8610 IS A SUBSTITUTE*****

## (undated) DEVICE — BLADE BANANA LABRAL ARTHRO-LOK 4.0MM (6EA/SP)

## (undated) DEVICE — ELECTRODE DUAL RETURN W/ CORD - (50/PK)

## (undated) DEVICE — SUTURE 3-0 MONOCRYL PLUS PS-2 - (12/BX)

## (undated) DEVICE — DRESSING 3X3 ADAPTIC GAUZE - (50EA/CT)

## (undated) DEVICE — NEPTUNE 4 PORT MANIFOLD - (20/PK)

## (undated) DEVICE — KIT ANESTHESIA W/CIRCUIT & 3/LT BAG W/FILTER (20EA/CA)

## (undated) DEVICE — PACK LOWER EXTREMITY - (2/CA)

## (undated) DEVICE — TUBING CLEARLINK DUO-VENT - C-FLO (48EA/CA)

## (undated) DEVICE — GLOVE BIOGEL PI INDICATOR SZ 8.0 SURGICAL PF LF -(50/BX 4BX/CA)

## (undated) DEVICE — MASK AIRWAY FLEXIBLE SINGLE-USE SIZE 4 ADULTS (10EA/BX)

## (undated) DEVICE — DRSG RELS 4X3IN ABS NADH WND - (RELEASE)

## (undated) DEVICE — TUBING PUMP WITH CONNECTOR REDEUCE (1EA)

## (undated) DEVICE — STAPLER SKIN DISP - (6/BX 10BX/CA) VISISTAT

## (undated) DEVICE — DRAPE LARGE 3 QUARTER - (20/CA)

## (undated) DEVICE — WRAP CO-FLEX 4IN X 5YD STERIL - SELF-ADHERENT (18/CA)

## (undated) DEVICE — TUBING PATIENT W/CONNECTOR REDEUCE (1EA)

## (undated) DEVICE — DRAPE U ORTHOPEDIC - (10/BX)

## (undated) DEVICE — Device

## (undated) DEVICE — DRAPE VERTICAL ISOLATION - (10EA/CA)

## (undated) DEVICE — ELECTRODE 850 FOAM ADHESIVE - HYDROGEL RADIOTRNSPRNT (50/PK)

## (undated) DEVICE — GLOVE BIOGEL INDICATOR SZ 7SURGICAL PF LTX - (50/BX 4BX/CA)

## (undated) DEVICE — GOWN SURGEONS X-LARGE - DISP. (30/CA)

## (undated) DEVICE — GLOVE BIOGEL PI INDICATOR SZ 7.0 SURGICAL PF LF - (50/BX 4BX/CA)

## (undated) DEVICE — SUCTION INSTRUMENT YANKAUER BULBOUS TIP W/O VENT (50EA/CA)

## (undated) DEVICE — SUTURE 3-0 MONOCRYL PLUS PS-1 - 27 INCH (36/BX)

## (undated) DEVICE — BLOCK

## (undated) DEVICE — CANISTER SUCTION RIGID RED 1500CC (40EA/CA)

## (undated) DEVICE — BANDAGE ELASTIC 4 HONEYCOMB - 4"X5YD LF (20/CA)"

## (undated) DEVICE — SODIUM CHL. IRRIGATION 0.9% 3000ML (4EA/CA 65CA/PF)

## (undated) DEVICE — GLOVE BIOGEL SZ 6.5 SURGICAL PF LTX (50PR/BX 4BX/CA)

## (undated) DEVICE — KIT ROOM DECONTAMINATION

## (undated) DEVICE — CANISTER SUCTION 3000ML MECHANICAL FILTER AUTO SHUTOFF MEDI-VAC NONSTERILE LF DISP  (40EA/CA)

## (undated) DEVICE — PADDING CAST 4 IN STERILE - 4 X 4 YDS (24/CA)

## (undated) DEVICE — BLADE SHAVER BONECUTTER 4.5MM DSP (3EA/BX)

## (undated) DEVICE — GLOVE SZ 7.5 BIOGEL PI MICRO - PF LF (50PR/BX)

## (undated) DEVICE — SUTURE 3-0 PROLENE PS-1 (12PK/BX)

## (undated) DEVICE — GLOVE BIOGEL INDICATOR SZ 8 SURGICAL PF LTX - (50/BX 4BX/CA)

## (undated) DEVICE — BLADE SAGITTAL SAW 9.4MM X 25.5MM X .4MM FINE TOOTH (1/EA)

## (undated) DEVICE — BANDAGE STERILE 3 IN X 75 IN (12EA/BX 8BX/CA)

## (undated) DEVICE — BLADE SURGICAL #15 - (50/BX 3BX/CA)

## (undated) DEVICE — DETERGENT RENUZYME PLUS 10 OZ PACKET (50/BX)

## (undated) DEVICE — GLOVE SZ 7 BIOGEL PI MICRO - PF LF (50PR/BX 4BX/CA)

## (undated) DEVICE — CANNULA COMPLETE HIP CLEAR TRAC 7MM X110MM (6EA/BX)

## (undated) DEVICE — TUBE CONNECTING SUCTION - CLEAR PLASTIC STERILE 72 IN (50EA/CA)

## (undated) DEVICE — DRAPE C-ARM LARGE 41IN X 74 IN - (10/BX 2BX/CA)

## (undated) DEVICE — DRAPE 36X28IN RAD CARM BND BG - (25/CA) O

## (undated) DEVICE — BOVIE BLADE COATED - (50/PK)

## (undated) DEVICE — SHAVER PLUS ELITE LONG INCISOR 4.5MM (3EA/BX)